# Patient Record
Sex: FEMALE | Race: BLACK OR AFRICAN AMERICAN | Employment: OTHER | URBAN - METROPOLITAN AREA
[De-identification: names, ages, dates, MRNs, and addresses within clinical notes are randomized per-mention and may not be internally consistent; named-entity substitution may affect disease eponyms.]

---

## 2018-02-05 LAB
CREATININE, EXTERNAL: 0.65
HBA1C MFR BLD HPLC: 5.7 %
LDL-C, EXTERNAL: 91

## 2018-06-14 ENCOUNTER — OFFICE VISIT (OUTPATIENT)
Dept: ENDOCRINOLOGY | Age: 68
End: 2018-06-14

## 2018-06-14 VITALS
HEIGHT: 59 IN | DIASTOLIC BLOOD PRESSURE: 74 MMHG | HEART RATE: 68 BPM | SYSTOLIC BLOOD PRESSURE: 143 MMHG | WEIGHT: 185 LBS | BODY MASS INDEX: 37.29 KG/M2

## 2018-06-14 DIAGNOSIS — E55.9 VITAMIN D DEFICIENCY: ICD-10-CM

## 2018-06-14 DIAGNOSIS — M88.9 PAGET DISEASE OF BONE: Primary | ICD-10-CM

## 2018-06-14 RX ORDER — ANASTROZOLE 1 MG/1
1 TABLET ORAL DAILY
COMMUNITY
End: 2020-01-20

## 2018-06-14 RX ORDER — CHOLECALCIFEROL TAB 125 MCG (5000 UNIT) 125 MCG
TAB ORAL DAILY
COMMUNITY
End: 2020-01-20

## 2018-06-14 RX ORDER — AMLODIPINE BESYLATE 5 MG/1
5 TABLET ORAL DAILY
COMMUNITY
End: 2020-10-22

## 2018-06-14 RX ORDER — ACETAMINOPHEN 500 MG
TABLET ORAL
COMMUNITY
End: 2020-06-23

## 2018-06-14 NOTE — PROGRESS NOTES
Chief Complaint   Patient presents with    Osteoporosis    New Patient     History of Present Illness: Angela Cavanaugh is a 75 y.o. female presents for evaluation of Paget's. She does have pain in left hip and radiation down left leg. Pain bothers her at end of the day after being active. She will take Aleve every few weeks if pain is very bothersome. Social:  Works for [employer]. Past Medical History:   Diagnosis Date    [facility]ortic [facility]     Asymmetric septal hypertrophy (HCC)     Breast cancer ([facility] [facility]     dx 7709    Diastolic heart failure ([address]     H/O partial adrenalectomy ([facility] [facility]     HTN (hypertension)     Paget disease of bone      Past Surgical History:   Procedure Laterality Date    HX ABDOMINAL LAPAROSCOPY      reports having mass removed near kidney (adrenal gland) to address HTN.  HX BREAST LUMPECTOMY      2014     Current Outpatient Prescriptions   Medication Sig    ergocalciferol, vitamin D2, (VITAMIN D2 PO) Take  by mouth.  anastrozole (ARIMIDEX) 1 mg tablet Take 1 mg by mouth daily.  amLODIPine (NORVASC) 5 mg tablet Take 5 mg by mouth daily.  acetaminophen (TYLENOL EXTRA STRENGTH) 500 mg tablet Take  by mouth every six (6) hours as needed for Pain. No current facility-administered medications for this visit. No Known Allergies  Family History   Problem Relation Age of Onset    Diabetes Mother     Lung Cancer Mother     Hypertension Mother     Lung Cancer Father      Social History     Social History    Marital status:      Spouse name: N/A    Number of children: N/A    Years of education: N/A     Occupational History    Not on file.      Social History Main Topics    Smoking status: Never Smoker    Smokeless tobacco: Never Used    Alcohol use No    Drug use: Not on file    Sexual activity: Not on file     Other Topics Concern    Not on file     Social History Narrative    No narrative on file       Review of Systems:  - Constitutional Symptoms: no fevers, chills, weight loss  - Eyes: no blurry vision or double vision  - Cardiovascular: no chest pain or palpitations  - Respiratory: no cough or shortness of breath  - Gastrointestinal: no dysphagia or abdominal pain  - Musculoskeletal: no joint pains or weakness  - Integumentary: no rashes  - Neurological: no numbness, tingling, or headaches  - Psychiatric: no depression or anxiety  - Endocrine: no heat or cold intolerance, no polyuria or polydipsia    Physical Examination:  Visit Vitals    /74    Pulse 68    Ht 4' 11\" (1.499 m)    Wt 185 lb (83.9 kg)    BMI 37.37 kg/m2   -   - General: pleasant, no distress,   - HEENT:No scleral/conjunctival injection, EOMI,  MMM  - Neck: supple, no thyromegaly, masses, lymph nodes, no supraclavicular or dorsocervical fat pads  - Cardiovascular: regular, normal rate  - Respiratory: normal effort  - Integumentary:  no edema  - Neurological: alert  - Psychiatric: normal mood and affect    Data Reviewed:   Nuclear medicine bone scan whole body- 11/25/2016  Heterogeneously increased uptake involves left iliac bone including iliac crest, superior and inferior pubic rami and left acetabulum, correlating with the heterogeneous sclerotic changes seen on multiple prior CTs glands, including earliest available CT from May 18, 2014     Labs from 2/5/2018  CBC normal  Chemistry significant for alkaline phosphatase of 213, otherwise normal  Cholesterol 27, triglycerides 92, HDL 78, LDL 91  Hemoglobin C5 0.7  20 two-point    Assessment/Plan:   1. Paget disease of bone   Region of interest was located in the left hip. This may be associated with some pain. She is planning on having her teeth extracted. Recommended she wait until after she has her teeth removed, and has healed, prior to proceeding with anti-osteoporosis medications. Reviewed with her that Reclast is typically the recommended treatment for this condition.   Treatment is often only needed once every 10 years. And symptoms may improve as well. Additionally, she will be less likely fracture after treatment. Reviewed potential side effects of Reclast as well. 2. Vitamin D deficiency   Continue supplementation-    Greater than 50% of 30 minute visit was spent counseling the patient about above. Patient Instructions   Paget's disease: Will obtain records from Dr Yoly Nation on treating you with Reclast, which is a medication used to treat osteoporosis. This is a 15 minute infusion. It works to decrease activity of osteoclasts, which are the cells in the bones that break down bones. Drink 2 full glasses of water prior to Reclast infusion and continue to drink more later in the day. If you have some muscle aches and mild fever after infusion, you can take Tylenol    Continue vitamin D - please confirm your dose      Will plan on having the Reclast done after you have healed from your teeth extractions. Follow-up Disposition:  Return in about 3 months (around 9/14/2018).     Copy sent to:

## 2018-06-14 NOTE — PATIENT INSTRUCTIONS
Paget's disease: Will obtain records from Dr Hortensia Jackson on treating you with Reclast, which is a medication used to treat osteoporosis. This is a 15 minute infusion. It works to decrease activity of osteoclasts, which are the cells in the bones that break down bones. Drink 2 full glasses of water prior to Reclast infusion and continue to drink more later in the day. If you have some muscle aches and mild fever after infusion, you can take Tylenol    Continue vitamin D - please confirm your dose      Will plan on having the Reclast done after you have healed from your teeth extractions.

## 2018-06-14 NOTE — MR AVS SNAPSHOT
[address] [facility] 
[phone] Patient: Paolo Lehman MRN: [id number] BNL:[id number] Visit Information Date & Time Provider Department Dept. Phone Encounter #  
 6/14/2018 10:50 AM Ernie Hernandez, [address] [place] [address] Diabetes and Endocrinology [phone] [id number] Follow-up Instructions Return in about 3 months (around 9/14/2018). Allergies as of 6/14/2018  Review Complete On: 6/14/2018 By: Ernie Hernandez MD  
 No Known Allergies Current Immunizations  Never Reviewed No immunizations on file. Not reviewed this visit You Were Diagnosed With   
  
 Codes Comments Paget disease of bone    -  Primary ICD-10-CM: M88.9 ICD-9-CM: 731.0 Vitamin D deficiency     ICD-10-CM: E55.9 ICD-9-CM: 268.9 Vitals BP Pulse Height(growth percentile) Weight(growth percentile) BMI Smoking Status 143/74 68 4' 11\" (1.499 m) 185 lb (83.9 kg) 37.37 kg/m2 Never Smoker Vitals History BMI and BSA Data Body Mass Index Body Surface Area  
 37.37 kg/m 2 1.87 m 2 Preferred Pharmacy Pharmacy Name Phone [id number] [phone] Your Updated Medication List  
  
   
This list is accurate as of 6/14/18 12:47 PM.  Always use your most recent med list. amLODIPine 5 mg tablet Commonly known as:  Mikal Hira Take 5 mg by mouth daily. anastrozole 1 mg tablet Commonly known as:  ARIMIDEX Take 1 mg by mouth daily. cholecalciferol (VITAMIN D3) 5,000 unit Tab tablet Commonly known as:  VITAMIN D3 Take  by mouth daily. TYLENOL EXTRA STRENGTH 500 mg tablet Generic drug:  acetaminophen Take  by mouth every six (6) hours as needed for Pain. Follow-up Instructions Return in about 3 months (around 9/14/2018). Patient Instructions Paget's disease: Will obtain records from Dr Valentin Mccarty Will plan on treating you with Reclast, which is a medication used to treat osteoporosis. This is a 15 minute infusion. It works to decrease activity of osteoclasts, which are the cells in the bones that break down bones. Drink 2 full glasses of water prior to Reclast infusion and continue to drink more later in the day. If you have some muscle aches and mild fever after infusion, you can take Tylenol Continue vitamin D - please confirm your dose Will plan on having the Reclast done after you have healed from your teeth extractions. Introducing [facility]! Magalis Corral introduces [facility] patient portal. Now you can access parts of your medical record, email your doctor's office, and request medication refills online. 1. In your internet browser, go to [email or web address] 2. Click on the First Time User? Click Here link in the Sign In box. You will see the New Member Sign Up page. 3. Enter your [facility] Access Code exactly as it appears below. You will not need to use this code after youve completed the sign-up process. If you do not sign up before the expiration date, you must request a new code. · [facility] Access Code: [email or web address] Expires: 9/12/2018 12:45 PM 
 
4. Enter the last four digits of your Social Security Number (xxxx) and Date of Birth (mm/dd/yyyy) as indicated and click Submit. You will be taken to the next sign-up page. 5. Create a [employer] ID. This will be your [facility] login ID and cannot be changed, so think of one that is secure and easy to remember. 6. Create a [facility] password. You can change your password at any time. 7. Enter your Password Reset Question and Answer. This can be used at a later time if you forget your password. 8. Enter your e-mail address. You will receive e-mail notification when new information is available in [address] 9. Click Sign Up. You can now view and download portions of your medical record. 10. Click the Download Summary menu link to download a portable copy of your medical information. If you have questions, please visit the Frequently Asked Questions section of the [employer] website. Remember, [employer] is NOT to be used for urgent needs. For medical emergencies, dial 911. Now available from your iPhone and Android! Please provide this summary of care documentation to your next provider. Your primary care clinician is listed as Ag Ordaz. If you have any questions after today's visit, please call [phone].

## 2018-07-05 ENCOUNTER — TELEPHONE (OUTPATIENT)
Dept: ENDOCRINOLOGY | Age: 68
End: 2018-07-05

## 2018-07-05 NOTE — TELEPHONE ENCOUNTER
Kelechi Ribeiro from Dr. Colin Dhaliwal) is calling for the progress notes from patient 's last appointment(which was her np on 06/14/18. Please fax it to [phone]. Yuko Perry.

## 2018-08-20 ENCOUNTER — HOSPITAL ENCOUNTER (OUTPATIENT)
Dept: LAB | Age: 68
Discharge: HOME | End: 2018-08-20

## 2018-08-20 PROCEDURE — 88305 TISSUE EXAM BY PATHOLOGIST: CPT | Performed by: PLASTIC SURGERY

## 2018-11-16 ENCOUNTER — OFFICE VISIT (OUTPATIENT)
Dept: ENDOCRINOLOGY | Age: 68
End: 2018-11-16

## 2018-11-16 VITALS
HEIGHT: 59 IN | DIASTOLIC BLOOD PRESSURE: 82 MMHG | BODY MASS INDEX: 37.5 KG/M2 | HEART RATE: 68 BPM | WEIGHT: 186 LBS | SYSTOLIC BLOOD PRESSURE: 165 MMHG

## 2018-11-16 DIAGNOSIS — E55.9 VITAMIN D DEFICIENCY: ICD-10-CM

## 2018-11-16 DIAGNOSIS — K08.9 DENTAL DISEASE: ICD-10-CM

## 2018-11-16 DIAGNOSIS — M88.88 PAGET'S DISEASE OF BONY PELVIS: Primary | ICD-10-CM

## 2018-11-16 NOTE — PATIENT INSTRUCTIONS
Paget's disease:    Have dental work done as soon as possible. I would like you to have this done before considering the Reclast treatment. Continue vitamin D    In May have labs repeated for kidney function, alkaline phophatase, and vitamin D in anticipation of having the Reclast.     In May or June, we will plan on treating you with Reclast, which is a medication used to treat osteoporosis. This is a 15 minute infusion. It works to decrease activity of osteoclasts, which are the cells in the bones that break down bones. Drink 2 full glasses of water prior to Reclast infusion and continue to drink more later in the day.     If you have some muscle aches and mild fever after infusion, you can take Tylenol

## 2018-11-16 NOTE — PROGRESS NOTES
History of Present Illness: Camron Headley is a 70 y.o. female presents for follow-up Paget's disease of bone    She was to have dental work done, prior to treatment with Reclast, but has yet to have this done, due to lack of funds. She does have pain in left hip and radiation down left leg. Pain bothers her at end of the day after being active. She will take Aleve every few weeks if pain is very bothersome. Social:  Works for [employer]. Past Medical History:   Diagnosis Date    [facility] ([facility] [facility]     Asymmetric septal hypertrophy (HCC)     Breast cancer ([facility] [address] [facility]     dx 8697    Diastolic heart failure ([facility]     H/O partial adrenalectomy (HCC)     HTN (hypertension)     Paget disease of bone      Current Outpatient Medications   Medication Sig    amLODIPine (NORVASC) 5 mg tablet Take 5 mg by mouth daily.  cholecalciferol, VITAMIN D3, (VITAMIN D3) 5,000 unit tab tablet Take  by mouth daily.  anastrozole (ARIMIDEX) 1 mg tablet Take 1 mg by mouth daily.  acetaminophen (TYLENOL EXTRA STRENGTH) 500 mg tablet Take  by mouth every six (6) hours as needed for Pain. No current facility-administered medications for this visit. No Known Allergies      Physical Examination:  Visit Vitals  /82   Pulse 68   Ht 4' 11\" (1.499 m)   Wt 186 lb (84.4 kg)   BMI 37.57 kg/m²   -   - General: pleasant, no distress, normal gait   HEENT: hearing intact, EOMI, clear sclera without icterus  - Cardiovascular: regular, normal rate   - Respiratory: normal effort  - Integumentary: no edema  - Psychiatric: normal mood and affect    Data Reviewed: At her last visit I I did not have access to the bone scan which done in 11/2016 and scan late June 2018  Scan showed Paget's lesion in left pelvis, affecting the iliac crest, as well as the acetabulum and superior and inferior pubic rami    Assessment/Plan:   1.  Paget's disease of bony pelvis   Advised her to have her teeth taken care of as soon as possible, Reclast infusion to treat the Paget's. Reviewed that I would recommend treatment for the Paget's due to the involvement of the pelvis and particularly due to the incontinence of the acetabulum, which could be associate with her symptoms and which could be associate with pain with movement of the hip. 2. Vitamin D deficiency   Continue supplementation   3. Dental disease   Is currently trying to accumulate enough money to have her dental work done. Patient Instructions   Paget's disease:    Have dental work done as soon as possible. I would like you to have this done before considering the Reclast treatment. Continue vitamin D    In May have labs repeated for kidney function, alkaline phophatase, and vitamin D in anticipation of having the Reclast.     In May or June, we will plan on treating you with Reclast, which is a medication used to treat osteoporosis. This is a 15 minute infusion. It works to decrease activity of osteoclasts, which are the cells in the bones that break down bones. Drink 2 full glasses of water prior to Reclast infusion and continue to drink more later in the day. If you have some muscle aches and mild fever after infusion, you can take Tylenol        Follow-up Disposition:  Return in about 6 months (around 5/16/2019).     Copy sent to:

## 2019-03-29 ENCOUNTER — HOSPITAL ENCOUNTER (OUTPATIENT)
Dept: MRI IMAGING | Age: 69
Discharge: HOME | End: 2019-03-29
Attending: FAMILY MEDICINE
Payer: MEDICARE

## 2019-03-29 DIAGNOSIS — M75.122 COMPLETE ROTATOR CUFF TEAR OF LEFT SHOULDER: ICD-10-CM

## 2019-03-29 PROCEDURE — 73221 MRI JOINT UPR EXTREM W/O DYE: CPT

## 2020-01-20 ENCOUNTER — HOSPITAL ENCOUNTER (OUTPATIENT)
Dept: PREADMISSION TESTING | Age: 70
Discharge: HOME | End: 2020-01-20
Payer: MEDICARE

## 2020-01-20 VITALS
HEIGHT: 62 IN | BODY MASS INDEX: 32.27 KG/M2 | TEMPERATURE: 98.4 F | RESPIRATION RATE: 16 BRPM | WEIGHT: 175.38 LBS | OXYGEN SATURATION: 100 % | SYSTOLIC BLOOD PRESSURE: 184 MMHG | DIASTOLIC BLOOD PRESSURE: 85 MMHG | HEART RATE: 76 BPM

## 2020-01-20 LAB
ABO + RH BLD: NORMAL
ALBUMIN SERPL-MCNC: 3.8 G/DL (ref 3.5–5)
ALBUMIN/GLOB SERPL: 1 {RATIO} (ref 1.1–2.2)
ALP SERPL-CCNC: 203 U/L (ref 45–117)
ALT SERPL-CCNC: 30 U/L (ref 12–78)
ANION GAP SERPL CALC-SCNC: 6 MMOL/L (ref 5–15)
APPEARANCE UR: CLEAR
AST SERPL-CCNC: 27 U/L (ref 15–37)
ATRIAL RATE: 71 BPM
BACTERIA URNS QL MICRO: ABNORMAL /HPF
BASOPHILS # BLD: 0.1 K/UL (ref 0–0.1)
BASOPHILS NFR BLD: 1 % (ref 0–1)
BILIRUB SERPL-MCNC: 0.9 MG/DL (ref 0.2–1)
BILIRUB UR QL: NEGATIVE
BLOOD GROUP ANTIBODIES SERPL: NORMAL
BUN SERPL-MCNC: 22 MG/DL (ref 6–20)
BUN/CREAT SERPL: 31 (ref 12–20)
CALCIUM SERPL-MCNC: 9.6 MG/DL (ref 8.5–10.1)
CALCULATED P AXIS, ECG09: 58 DEGREES
CALCULATED R AXIS, ECG10: 35 DEGREES
CALCULATED T AXIS, ECG11: 55 DEGREES
CHLORIDE SERPL-SCNC: 109 MMOL/L (ref 97–108)
CO2 SERPL-SCNC: 25 MMOL/L (ref 21–32)
COLOR UR: ABNORMAL
CREAT SERPL-MCNC: 0.72 MG/DL (ref 0.55–1.02)
DIAGNOSIS, 93000: NORMAL
DIFFERENTIAL METHOD BLD: ABNORMAL
EOSINOPHIL # BLD: 0.2 K/UL (ref 0–0.4)
EOSINOPHIL NFR BLD: 3 % (ref 0–7)
EPITH CASTS URNS QL MICRO: ABNORMAL /LPF
ERYTHROCYTE [DISTWIDTH] IN BLOOD BY AUTOMATED COUNT: 14.4 % (ref 11.5–14.5)
EST. AVERAGE GLUCOSE BLD GHB EST-MCNC: 123 MG/DL
GLOBULIN SER CALC-MCNC: 4 G/DL (ref 2–4)
GLUCOSE SERPL-MCNC: 89 MG/DL (ref 65–100)
GLUCOSE UR STRIP.AUTO-MCNC: NEGATIVE MG/DL
HBA1C MFR BLD: 5.9 % (ref 4–5.6)
HCT VFR BLD AUTO: 42.5 % (ref 35–47)
HGB BLD-MCNC: 13.4 G/DL (ref 11.5–16)
HGB UR QL STRIP: NEGATIVE
HYALINE CASTS URNS QL MICRO: ABNORMAL /LPF (ref 0–5)
IMM GRANULOCYTES # BLD AUTO: 0 K/UL (ref 0–0.04)
IMM GRANULOCYTES NFR BLD AUTO: 0 % (ref 0–0.5)
KETONES UR QL STRIP.AUTO: NEGATIVE MG/DL
LEUKOCYTE ESTERASE UR QL STRIP.AUTO: NEGATIVE
LYMPHOCYTES # BLD: 1.8 K/UL (ref 0.8–3.5)
LYMPHOCYTES NFR BLD: 33 % (ref 12–49)
MCH RBC QN AUTO: 25.9 PG (ref 26–34)
MCHC RBC AUTO-ENTMCNC: 31.5 G/DL (ref 30–36.5)
MCV RBC AUTO: 82 FL (ref 80–99)
MONOCYTES # BLD: 0.3 K/UL (ref 0–1)
MONOCYTES NFR BLD: 6 % (ref 5–13)
NEUTS SEG # BLD: 2.9 K/UL (ref 1.8–8)
NEUTS SEG NFR BLD: 57 % (ref 32–75)
NITRITE UR QL STRIP.AUTO: POSITIVE
NRBC # BLD: 0 K/UL (ref 0–0.01)
NRBC BLD-RTO: 0 PER 100 WBC
P-R INTERVAL, ECG05: 154 MS
PH UR STRIP: 5.5 [PH] (ref 5–8)
PLATELET # BLD AUTO: 325 K/UL (ref 150–400)
POTASSIUM SERPL-SCNC: 4.8 MMOL/L (ref 3.5–5.1)
PROT SERPL-MCNC: 7.8 G/DL (ref 6.4–8.2)
PROT UR STRIP-MCNC: NEGATIVE MG/DL
Q-T INTERVAL, ECG07: 392 MS
QRS DURATION, ECG06: 78 MS
QTC CALCULATION (BEZET), ECG08: 425 MS
RBC # BLD AUTO: 5.18 M/UL (ref 3.8–5.2)
RBC #/AREA URNS HPF: ABNORMAL /HPF (ref 0–5)
RBC MORPH BLD: ABNORMAL
SODIUM SERPL-SCNC: 140 MMOL/L (ref 136–145)
SP GR UR REFRACTOMETRY: 1.02 (ref 1–1.03)
SPECIMEN EXP DATE BLD: NORMAL
UA: UC IF INDICATED,UAUC: ABNORMAL
UROBILINOGEN UR QL STRIP.AUTO: 0.2 EU/DL (ref 0.2–1)
VENTRICULAR RATE, ECG03: 71 BPM
WBC # BLD AUTO: 5.3 K/UL (ref 3.6–11)
WBC URNS QL MICRO: ABNORMAL /HPF (ref 0–4)

## 2020-01-20 PROCEDURE — 85025 COMPLETE CBC W/AUTO DIFF WBC: CPT

## 2020-01-20 PROCEDURE — 87077 CULTURE AEROBIC IDENTIFY: CPT

## 2020-01-20 PROCEDURE — 86900 BLOOD TYPING SEROLOGIC ABO: CPT

## 2020-01-20 PROCEDURE — 87186 SC STD MICRODIL/AGAR DIL: CPT

## 2020-01-20 PROCEDURE — 81001 URINALYSIS AUTO W/SCOPE: CPT

## 2020-01-20 PROCEDURE — 80053 COMPREHEN METABOLIC PANEL: CPT

## 2020-01-20 PROCEDURE — 83036 HEMOGLOBIN GLYCOSYLATED A1C: CPT

## 2020-01-20 PROCEDURE — 36415 COLL VENOUS BLD VENIPUNCTURE: CPT

## 2020-01-20 PROCEDURE — 87086 URINE CULTURE/COLONY COUNT: CPT

## 2020-01-20 PROCEDURE — 93005 ELECTROCARDIOGRAM TRACING: CPT

## 2020-01-20 RX ORDER — ERGOCALCIFEROL 1.25 MG/1
50000 CAPSULE ORAL
COMMUNITY
End: 2020-10-22

## 2020-01-20 NOTE — PERIOP NOTES
Patient complaining of tooth pain x several days. Advised to contact dentist today and to call PAT if any medication is prescribed.

## 2020-01-20 NOTE — H&P
Preoperative Evaluation                     History and Physical with Surgical Risk Stratification     1/20/2020    CC: Left shoulder pain  Surgery: LRTS     HPI:   Jayde Ken is a 78 y.o. female referred for pre-operative evaluation by Dr. Shweta Strange for surgery on 1/31/20. Ms. Tauna Snellen comes today in [facility] for her upcoming surgery. She struggles a bit with past medical history and states \"I don't remember\". There was several things under medical history that she declined were hers. She notes her shoulder has been hurting for about a year and the pain is severe. She had a fall a year ago which started the pain. She has failed all conservative measures. She is LDH and has limited ROM. She feels the pain and immobility is impacting her life. She notes if she holds a cup of coffee with her left hand she will drop it. Denies numbness and tingling. Ms. Tauna Snellen states she has an infected tooth on the upper left side that started to hurt Friday. She has a history of this and she states last time she called and they put her on ABX. She will be calling after she leaves here. The patient was evaluated in the surgeon's office and it was determined that the most appropriate plan of care is to proceed with surgical intervention. Patient's PCP Sarahi Blas DO    Review of Systems     Constitutional: Negative for chills and fever  HENT: Negative for congestion and sore throat  Eyes: negative for blurred vision and double vision  Respiratory: Negative for cough, shortness of breath and wheezing  Mouth: Positive for left upper painful tooth  Cardiovascular: Negative for chest pain and palpitations  Gastrointestinal: Negative for abdominal pain, constipation, diarrhea and nausea  Genitourinary: Negative for dysuria and hematuria  Musculoskeletal: Positive for left shoulder pain  Skin: Negative for rash, open wounds.   Negative for bruises easily  Neurological: Negative for dizziness, tremors and headaches  Psychiatric: Negative for depression. The patient is not nervous/anxious. Inherent Risk of Surgery     Surgical risk:  Intermediate  Low:  EIntermediate:  Joint Replacement, High:    Patient Cardiac Risk Assessment     Revised Cardiac Risk Index (RCRI)    Rate if cardiac death, nonfatal MI, nonfatal cardiac arrest by number of risk factor- 0.4%    CHRISTAL/AHA 2007 Guidelines:   1) Surgery Emergency, Non-cardiac -> to surgery  2) If not, look at clinical predictors    Major Intermediate Minor       Blood Thinner: None    METS      EQUAL TO 4 Care for self Walk indoors around house Walk 2-3 blocks on level ground (2-3 mph) Light work around house (dust, dishes)     Other Risk Factors:   Screening for ETOH use:  Done and low risk  Smoking status:   None    Personal or FH of bleeding problems:  No  Personal or FH of blood clots:  No  Personal or FH of anesthesia problems:   No    Pulmonary Risk:  Asthma or COPD:  No  Body mass index is 32.08 kg/m². Known ERICH:  No  Albumin normal, BUN normal    Past Medical, Surgical, Social History     Allergies: No Known Allergies    Patient did bring in medication list/bottles to review. Medication Documentation Review Audit     Reviewed by Ty Silva RN (Registered Nurse) on 01/20/20 at 8517    Medication Sig Documenting Provider Last Dose Status Taking?   acetaminophen (TYLENOL EXTRA STRENGTH) 500 mg tablet Take  by mouth every six (6) hours as needed for Pain. Provider, Historical  Active Yes   amLODIPine (NORVASC) 5 mg tablet Take 5 mg by mouth daily. Provider, Historical  Active Yes   ergocalciferol (ERGOCALCIFEROL) 1,250 mcg (50,000 unit) capsule Take 50,000 Units by mouth every seven (7) days.  Provider, Historical  Active Yes              Past Medical History:   Diagnosis Date    Breast cancer ([facility] [address] 2014    H/O partial adrenalectomy ([facility] [address]     HTN (hypertension)     Low vitamin D level     Obesity, Class I, BMI 30-34.9     Paget disease of bone     Prediabetes 01/20/2020    5.9    SBO (small bowel obstruction) ([facility] [address] 1995     Past Surgical History:   Procedure Laterality Date    HX ABDOMINAL LAPAROSCOPY      reports having mass removed near kidney (adrenal gland) to address HTN.  HX BLADDER SUSPENSION  2019    HX BREAST LUMPECTOMY Left 2014    HX CATARACT REMOVAL Bilateral     HX COLECTOMY  1995    HX CYST REMOVAL      Eyelid    HX KNEE REPLACEMENT Left 1998     Social History     Tobacco Use    Smoking status: Never Smoker    Smokeless tobacco: Never Used   Substance Use Topics    Alcohol use: No    Drug use: Never     Family History   Problem Relation Age of Onset    Diabetes Mother     Lung Cancer Mother     Hypertension Mother     Lung Cancer Father     Anesth Problems Neg Hx     Deep Vein Thrombosis Neg Hx        Objective     Vitals:    01/20/20 0800 01/20/20 0842   BP: (!) 207/98 184/85   Pulse: 76    Resp: 16    Temp: 98.4 °F (36.9 °C)    SpO2: 100%    Weight: 79.5 kg (175 lb 6 oz)    Height: 5' 2\" (1.575 m)      She states she had not taken her BP medications before coming today. I gave her some water and she took medication during appointment. Constitutional:  Appears well,  No Acute Distress, Vitals noted  Psychiatric:   Affect normal, Alert and Oriented to person/place/time    Eyes:   Pupils equally round and reactive, EOMI, conjunctiva clear, eyelids normal  ENT:   External ears and nose normal/lips, teeth normal, gums normal, TMs and Orophyarynx normal  Neck:   General inspection and Thyroid normal.  No abnormal cervical or supraclavicular nodes    Lungs:   Clear to auscultation, good respiratory effort  Heart: Ausculation normal.  Regular rhythm. No cardiac murmurs.   No carotid bruits or palpable thrills  Chest wall normal  Musculoskeletal: Limited ROM to left shoulder  Extremities:   Without edema, good peripheral pulses  Skin:   Warm to palpation, without rashes, bruising, or suspicious lesions     Recent Results (from the past 72 hour(s))   CBC WITH AUTOMATED DIFF    Collection Time: 01/20/20  8:58 AM   Result Value Ref Range    WBC 5.3 3.6 - 11.0 K/uL    RBC 5.18 3.80 - 5.20 M/uL    HGB 13.4 11.5 - 16.0 g/dL    HCT 42.5 35.0 - 47.0 %    MCV 82.0 80.0 - 99.0 FL    MCH 25.9 (L) 26.0 - 34.0 PG    MCHC 31.5 30.0 - 36.5 g/dL    RDW 14.4 11.5 - 14.5 %    PLATELET 616 171 - 235 K/uL    NRBC 0.0 0  WBC    ABSOLUTE NRBC 0.00 0.00 - 0.01 K/uL    NEUTROPHILS 57 32 - 75 %    LYMPHOCYTES 33 12 - 49 %    MONOCYTES 6 5 - 13 %    EOSINOPHILS 3 0 - 7 %    BASOPHILS 1 0 - 1 %    IMMATURE GRANULOCYTES 0 0.0 - 0.5 %    ABS. NEUTROPHILS 2.9 1.8 - 8.0 K/UL    ABS. LYMPHOCYTES 1.8 0.8 - 3.5 K/UL    ABS. MONOCYTES 0.3 0.0 - 1.0 K/UL    ABS. EOSINOPHILS 0.2 0.0 - 0.4 K/UL    ABS. BASOPHILS 0.1 0.0 - 0.1 K/UL    ABS. IMM. GRANS. 0.0 0.00 - 0.04 K/UL    DF SMEAR SCANNED      RBC COMMENTS NORMOCYTIC, NORMOCHROMIC     METABOLIC PANEL, COMPREHENSIVE    Collection Time: 01/20/20  8:58 AM   Result Value Ref Range    Sodium 140 136 - 145 mmol/L    Potassium 4.8 3.5 - 5.1 mmol/L    Chloride 109 (H) 97 - 108 mmol/L    CO2 25 21 - 32 mmol/L    Anion gap 6 5 - 15 mmol/L    Glucose 89 65 - 100 mg/dL    BUN 22 (H) 6 - 20 MG/DL    Creatinine 0.72 0.55 - 1.02 MG/DL    BUN/Creatinine ratio 31 (H) 12 - 20      GFR est AA >60 >60 ml/min/1.73m2    GFR est non-AA >60 >60 ml/min/1.73m2    Calcium 9.6 8.5 - 10.1 MG/DL    Bilirubin, total 0.9 0.2 - 1.0 MG/DL    ALT (SGPT) 30 12 - 78 U/L    AST (SGOT) 27 15 - 37 U/L    Alk.  phosphatase 203 (H) 45 - 117 U/L    Protein, total 7.8 6.4 - 8.2 g/dL    Albumin 3.8 3.5 - 5.0 g/dL    Globulin 4.0 2.0 - 4.0 g/dL    A-G Ratio 1.0 (L) 1.1 - 2.2     HEMOGLOBIN A1C WITH EAG    Collection Time: 01/20/20  8:58 AM   Result Value Ref Range    Hemoglobin A1c 5.9 (H) 4.0 - 5.6 %    Est. average glucose 123 mg/dL   URINALYSIS W/ REFLEX CULTURE    Collection Time: 01/20/20  8:58 AM   Result Value Ref Range    Color YELLOW/STRAW Appearance CLEAR CLEAR      Specific gravity 1.018 1.003 - 1.030      pH (UA) 5.5 5.0 - 8.0      Protein NEGATIVE  NEG mg/dL    Glucose NEGATIVE  NEG mg/dL    Ketone NEGATIVE  NEG mg/dL    Bilirubin NEGATIVE  NEG      Blood NEGATIVE  NEG      Urobilinogen 0.2 0.2 - 1.0 EU/dL    Nitrites POSITIVE (A) NEG      Leukocyte Esterase NEGATIVE  NEG      WBC 0-4 0 - 4 /hpf    RBC 0-5 0 - 5 /hpf    Epithelial cells FEW FEW /lpf    Bacteria 4+ (A) NEG /hpf    UA:UC IF INDICATED URINE CULTURE ORDERED (A) CNI      Hyaline cast 0-2 0 - 5 /lpf   TYPE & SCREEN    Collection Time: 01/20/20  8:58 AM   Result Value Ref Range    Crossmatch Expiration 02/03/2020     ABO/Rh(D) O POSITIVE     Antibody screen NEG    EKG, 12 LEAD, INITIAL    Collection Time: 01/20/20  9:24 AM   Result Value Ref Range    Ventricular Rate 71 BPM    Atrial Rate 71 BPM    P-R Interval 154 ms    QRS Duration 78 ms    Q-T Interval 392 ms    QTC Calculation (Bezet) 425 ms    Calculated P Axis 58 degrees    Calculated R Axis 35 degrees    Calculated T Axis 55 degrees    Diagnosis       Normal sinus rhythm  Normal ECG  No previous ECGs available  Confirmed by Wendi Walters MD., Maribel Hernandes ([id number]) on 1/20/2020 12:11:49 PM         Assessment and Plan     Assessment/Plan:   1) OA Left Shoulder  2) Pre-Operative Evaluation    Labs and EKG reviewed. MRSA & Urine culture pending    I have instructed her to call us after speaking to the dentist. It is important that she clear up any infection in her mouth before having her joint replaced. Sent message to PA also. PT/PTT clotted in lab. Draw DOS. Preoperative Clearance  Per RCRI, the patient has a 0.4% risk of cardiac death, nonfatal MI, nonfatal cardiac arrest based on no risk factors. Per ACC/AHA guidelines, patient is low risk for a(n) intermediate risk surgery and may proceed to planned surgery with the above noted risk.     Kacey Dunham NP

## 2020-01-20 NOTE — PERIOP NOTES
[address] [address] [facility]   MAIN OR                                  [phone]   MAIN PRE OP                          [phone]                                                                                AMBULATORY PRE OP          [phone]  PRE-ADMISSION TESTING    [phone]     Surgery Date:   Friday January 31, 2020        Is surgery arrival time given by surgeon? NO  If NO, [facility] staff will call you between 3 and 7pm the day before your surgery with your arrival time. (If your surgery is on a Monday, we will call you the Friday before.)    Call [phone] after 7pm Monday-Friday if you did not receive this call. INSTRUCTIONS BEFORE YOUR SURGERY   When You  Arrive Arrive at the 2nd [address] [facility] on the day of your surgery  Have your insurance card, photo ID, and any copayment (if needed)   Food   and   Drink NO food or drink after midnight the night before surgery    This means NO water, gum, mints, coffee, juice, etc.  No alcohol (beer, wine, liquor) 24 hours before and after surgery   Medications to   TAKE   Morning of Surgery MEDICATIONS TO TAKE THE MORNING OF SURGERY WITH A SIP OF WATER:    Amolodipine   Medications  To  STOP      7 days before surgery  Non-Steroidal anti-inflammatory Drugs (NSAID's): for example, Ibuprofen (Advil, Motrin), Naproxen (Aleve)   Aspirin, if taking for pain    Herbal supplements, vitamins, and fish oil     (Pain medications not listed above, including Tylenol may be taken)   Blood  Thinners  Not applicable   Bathing Clothing  Jewelry  Valuables      If you shower the morning of surgery, please do not apply anything to your skin (lotions, powders, deodorant, or makeup, especially mascara)   Follow Chlorhexidine Care Fusion body wash instructions provided to you during PAT appointment. Begin 3 days prior to surgery.    Do not shave or trim anywhere 24 hours before surgery   Wear your hair loose or down; no pony-tails, buns, or metal hair clips   Wear loose, comfortable, clean clothes   Leave money, valuables, and jewelry, including body piercings, at home   Going Home - or Spending the Night  SAME-DAY SURGERY: You must have a responsible adult drive you home and stay with you 24 hours after surgery   ADMITS: If your doctor is keeping you in the hospital after surgery, leave personal belongings/luggage in your car until you have a hospital room number. Hospital discharge time is 12 noon  Drivers must be here before 12 noon unless you are told differently   Special Instructions   Special Instructions:  · Use Chlorhexidine Care Fusion wash and sponges 3 days prior to surgery as instructed. · Incentive spirometer given with instructions to practice at home and bring back to the hospital on the day of surgery. · [facility] will contact you if your Hemoglobin A1C is greater than 7.5. · Ensure/Glucerna  sample, nutritional information, and Ensure/Glucerna coupon given. · Pain pamphlet and Call Don't Fall reminder reviewed with patient. ·  parking is complimentary Monday - Friday 7 am - 5 pm  · Bring PTA Medication list day of surgery with the last doses taken documented     Follow all instructions so your surgery wont be cancelled. Please, be on time. If a situation occurs and you are delayed the day of surgery, call [phone]. If your physical condition changes (like a fever, cold, flu, etc.) call your surgeon. Home medication(s) reviewed and verified verbally with patient and by bottles during PAT appointment. The patient was contacted  in person. The patient verbalizes understanding of all instructions and does not  need reinforcement.

## 2020-01-21 LAB
BACTERIA SPEC CULT: NORMAL
BACTERIA SPEC CULT: NORMAL
SERVICE CMNT-IMP: NORMAL

## 2020-01-21 RX ORDER — AMOXICILLIN 500 MG/1
500 CAPSULE ORAL 3 TIMES DAILY
COMMUNITY
End: 2020-06-09

## 2020-01-21 NOTE — PERIOP NOTES
Pt called to report antibiotic therapy initiated. Instructed her to take the entire prescription, even if the tooth felt better. Advised that it may cause nausea, so if she had not completed regime by DOS, she could hold. She verbalized understanding.

## 2020-01-22 LAB
BACTERIA SPEC CULT: ABNORMAL
CC UR VC: ABNORMAL
SERVICE CMNT-IMP: ABNORMAL

## 2020-01-23 NOTE — PROGRESS NOTES
Notification of Positive Urine Culture     Patient Name:  Ray Wilson  MRN: [id number]  : 1950    Surgeon: Jorge Banks  Date of surgery: 20  Procedure: Manjit Huertas    Allergies: No Known Allergies    Urine culture result:     Culture result:   Date Value Ref Range Status   2020 MRSA NOT PRESENT   Final   2020    Final        Screening of patient nares for MRSA is for surveillance purposes and, if positive, to facilitate isolation considerations in high risk settings. It is not intended for automatic decolonization interventions per se as regimens are not sufficiently effective to warrant routine use. 2020 ESCHERICHIA COLI (A)   Final       Treatment ordered: Patient is already on Amoxicillin that she started on Tuesday, 20 for a tooth ache. I have spoken to her that this would cover her UTI also. She is taking it 3x a day. She states her tooth feels better and the swelling has gone down. Spoke to her via phone on 20.     The patient was instructed to add medication and date they began treatment to their \"Prior to Admission Medication\" list given to them in [address], NP

## 2020-06-09 ENCOUNTER — HOSPITAL ENCOUNTER (OUTPATIENT)
Dept: PREADMISSION TESTING | Age: 70
Discharge: HOME | End: 2020-06-09
Payer: MEDICARE

## 2020-06-09 VITALS
BODY MASS INDEX: 31.01 KG/M2 | TEMPERATURE: 97.9 F | HEIGHT: 63 IN | HEART RATE: 68 BPM | OXYGEN SATURATION: 98 % | WEIGHT: 175 LBS | SYSTOLIC BLOOD PRESSURE: 150 MMHG | RESPIRATION RATE: 16 BRPM | DIASTOLIC BLOOD PRESSURE: 76 MMHG

## 2020-06-09 LAB
ABO + RH BLD: NORMAL
ALBUMIN SERPL-MCNC: 3.8 G/DL (ref 3.5–5)
ALBUMIN/GLOB SERPL: 1.1 {RATIO} (ref 1.1–2.2)
ALP SERPL-CCNC: 224 U/L (ref 45–117)
ALT SERPL-CCNC: 24 U/L (ref 12–78)
ANION GAP SERPL CALC-SCNC: 5 MMOL/L (ref 5–15)
APPEARANCE UR: CLEAR
APTT PPP: 26.9 SEC (ref 22.1–32)
AST SERPL-CCNC: 19 U/L (ref 15–37)
BACTERIA URNS QL MICRO: ABNORMAL /HPF
BASOPHILS # BLD: 0 K/UL (ref 0–0.1)
BASOPHILS NFR BLD: 1 % (ref 0–1)
BILIRUB SERPL-MCNC: 0.7 MG/DL (ref 0.2–1)
BILIRUB UR QL: NEGATIVE
BLOOD GROUP ANTIBODIES SERPL: NORMAL
BUN SERPL-MCNC: 13 MG/DL (ref 6–20)
BUN/CREAT SERPL: 17 (ref 12–20)
CALCIUM SERPL-MCNC: 9.4 MG/DL (ref 8.5–10.1)
CHLORIDE SERPL-SCNC: 109 MMOL/L (ref 97–108)
CO2 SERPL-SCNC: 27 MMOL/L (ref 21–32)
COLOR UR: ABNORMAL
CREAT SERPL-MCNC: 0.76 MG/DL (ref 0.55–1.02)
DIFFERENTIAL METHOD BLD: NORMAL
EOSINOPHIL # BLD: 0.3 K/UL (ref 0–0.4)
EOSINOPHIL NFR BLD: 7 % (ref 0–7)
EPITH CASTS URNS QL MICRO: ABNORMAL /LPF
ERYTHROCYTE [DISTWIDTH] IN BLOOD BY AUTOMATED COUNT: 14.4 % (ref 11.5–14.5)
EST. AVERAGE GLUCOSE BLD GHB EST-MCNC: 114 MG/DL
GLOBULIN SER CALC-MCNC: 3.5 G/DL (ref 2–4)
GLUCOSE SERPL-MCNC: 87 MG/DL (ref 65–100)
GLUCOSE UR STRIP.AUTO-MCNC: NEGATIVE MG/DL
HBA1C MFR BLD: 5.6 % (ref 4–5.6)
HCT VFR BLD AUTO: 40.3 % (ref 35–47)
HGB BLD-MCNC: 12.7 G/DL (ref 11.5–16)
HGB UR QL STRIP: NEGATIVE
HYALINE CASTS URNS QL MICRO: ABNORMAL /LPF (ref 0–5)
IMM GRANULOCYTES # BLD AUTO: 0 K/UL (ref 0–0.04)
IMM GRANULOCYTES NFR BLD AUTO: 0 % (ref 0–0.5)
INR PPP: 0.9 (ref 0.9–1.1)
KETONES UR QL STRIP.AUTO: NEGATIVE MG/DL
LEUKOCYTE ESTERASE UR QL STRIP.AUTO: ABNORMAL
LYMPHOCYTES # BLD: 1.9 K/UL (ref 0.8–3.5)
LYMPHOCYTES NFR BLD: 39 % (ref 12–49)
MCH RBC QN AUTO: 26.1 PG (ref 26–34)
MCHC RBC AUTO-ENTMCNC: 31.5 G/DL (ref 30–36.5)
MCV RBC AUTO: 82.9 FL (ref 80–99)
MONOCYTES # BLD: 0.4 K/UL (ref 0–1)
MONOCYTES NFR BLD: 9 % (ref 5–13)
NEUTS SEG # BLD: 2.2 K/UL (ref 1.8–8)
NEUTS SEG NFR BLD: 44 % (ref 32–75)
NITRITE UR QL STRIP.AUTO: POSITIVE
NRBC # BLD: 0 K/UL (ref 0–0.01)
NRBC BLD-RTO: 0 PER 100 WBC
PH UR STRIP: 5 [PH] (ref 5–8)
PLATELET # BLD AUTO: 311 K/UL (ref 150–400)
PMV BLD AUTO: 10.2 FL (ref 8.9–12.9)
POTASSIUM SERPL-SCNC: 4.5 MMOL/L (ref 3.5–5.1)
PROT SERPL-MCNC: 7.3 G/DL (ref 6.4–8.2)
PROT UR STRIP-MCNC: NEGATIVE MG/DL
PROTHROMBIN TIME: 9.8 SEC (ref 9–11.1)
RBC # BLD AUTO: 4.86 M/UL (ref 3.8–5.2)
RBC #/AREA URNS HPF: ABNORMAL /HPF (ref 0–5)
SODIUM SERPL-SCNC: 141 MMOL/L (ref 136–145)
SP GR UR REFRACTOMETRY: 1.02 (ref 1–1.03)
SPECIMEN EXP DATE BLD: NORMAL
THERAPEUTIC RANGE,PTTT: NORMAL SECS (ref 58–77)
UA: UC IF INDICATED,UAUC: ABNORMAL
UROBILINOGEN UR QL STRIP.AUTO: 0.2 EU/DL (ref 0.2–1)
WBC # BLD AUTO: 4.9 K/UL (ref 3.6–11)
WBC URNS QL MICRO: ABNORMAL /HPF (ref 0–4)

## 2020-06-09 PROCEDURE — 36415 COLL VENOUS BLD VENIPUNCTURE: CPT

## 2020-06-09 PROCEDURE — 85730 THROMBOPLASTIN TIME PARTIAL: CPT

## 2020-06-09 PROCEDURE — 80053 COMPREHEN METABOLIC PANEL: CPT

## 2020-06-09 PROCEDURE — 85025 COMPLETE CBC W/AUTO DIFF WBC: CPT

## 2020-06-09 PROCEDURE — 86900 BLOOD TYPING SEROLOGIC ABO: CPT

## 2020-06-09 PROCEDURE — 85610 PROTHROMBIN TIME: CPT

## 2020-06-09 PROCEDURE — 81001 URINALYSIS AUTO W/SCOPE: CPT

## 2020-06-09 PROCEDURE — 83036 HEMOGLOBIN GLYCOSYLATED A1C: CPT

## 2020-06-09 NOTE — PERIOP NOTES
It is now mandated that all surgical patients be tested for COVID-19 prior to surgery. Testing has to be exactly 4 days prior to surgery. Your COVID test date is Thursday, 6/18/20, between 8:00 am and 11:00 am.       COVID testing will be performed curbside at the [facility]. There will be signs leading you to the testing site. You will need to bring a photo ID with you to be swabbed. Patients are advised to self-quarantine at home after testing and prior to your surgery date. You will be notified if your results are positive.     What to watch for:   Coronavirus (COVID-19) affects different people in different ways   It also appears with a wide range of symptoms from mild to severe   Signs usually appear 2-14 days after exposure     If you develop any of the following, notify your doctor immediately:  o Fever  o Chills, with or without a shiver  o Muscle pain  o Headache  o Sore throat  o Dry cough  o New loss of taste or smell  o Tiredness      If you develop any of the following, call 911:  o Shortness of breath  o Difficulty breathing  o Chest pain  o New confusion  o Blueness of fingers and/or lips

## 2020-06-09 NOTE — PERIOP NOTES
[address], [address] [facility]  PRE-ADMISSION TESTING    [phone]     Surgery Date:   Monday, 6/22/20      [facility] staff will call you between 3 and 7pm the day before your surgery with your arrival time. Call [phone] after 7pm Friday if you did not receive this call. INSTRUCTIONS BEFORE YOUR SURGERY   When You  Arrive Arrive at the 2[address] [facility] on the day of your surgery  Have your insurance card, photo ID, and any copayment (if needed)   Food   and   Drink NO food or drink after midnight the night before surgery    This means NO water, gum, mints, coffee, juice, etc.  No alcohol (beer, wine, liquor) 24 hours before and after surgery   Medications to   TAKE   Morning of Surgery MEDICATIONS TO TAKE THE MORNING OF SURGERY WITH A SIP OF WATER:    amlodipine   Tylenol/acetaminophen products may be taken for pain, if needed   Please take vitamin d on Sunday, 6/21/20   Medications  To  STOP      7 days before surgery  Non-Steroidal anti-inflammatory Drugs (NSAID's): for example, Ibuprofen (Advil, Motrin), Naproxen (Aleve)   Aspirin, if taking for pain    Herbal supplements, vitamins, and fish oil   Financial Assistance  [phone]   Bathing Clothing  Jewelry  Valuables      If you shower the morning of surgery, please do not apply anything to your skin (lotions, powders, deodorant, or makeup, especially mascara)   Follow Chlorhexidine Care Fusion body wash instructions provided to you during PAT appointment. Begin 3 days prior to surgery.  Do not shave or trim anywhere 24 hours before surgery   Wear your hair loose or down; no pony-tails, buns, or metal hair clips   Wear loose, comfortable clothes   Leave money, valuables, and jewelry, including body piercings, at home  Incentive spirometer given with instructions to practice at home and bring back to the hospital on the day of surgery.   [facility] will [facility] you if your Hemoglobin A1C is greater than 7.5. Call Don't Fall reminder given. Bring Medication list (with last doses) on the day of surgery. Spending the Night Your doctor is keeping you in the hospital after surgery, bring personal belongings/luggage in with you the morning of surgery. Hospital discharge time is 12 noon  Drivers must be here before 12 noon unless you are told differently   Special Instructions If a situation occurs and you are delayed the day of surgery, call [phone]. If your physical condition changes (like a fever, cold, flu, etc.) call your surgeon. Home medication(s) reviewed and verified verbally during PAT appointment. The patient was contacted  in person. The patient verbalizes understanding of all instructions and does not  need reinforcement.

## 2020-06-10 LAB
BACTERIA SPEC CULT: NORMAL
BACTERIA SPEC CULT: NORMAL
SERVICE CMNT-IMP: NORMAL

## 2020-06-11 NOTE — H&P
Preoperative Evaluation                     History and Physical with Surgical Risk Stratification       6/9/2020    CC: Left shoulder pain  Surgery: LRTS     HPI:   Kareem Ocampo is a 78 y.o. female referred for pre-operative evaluation by Dr. Yanet De Leon for surgery on 6/22/20. Ms. Thania Jeong is here today after her surgery was cancelled in January r/t dental issues. She has since had all her teeth pulled and is awaiting dentures. She notes no new health concerns. Denies CP or SOB. For full pain history see prior H/P. The patient was evaluated in the surgeon's office and it was determined that the most appropriate plan of care is to proceed with surgical intervention. Patient's PCP Kiana Ayon, DO    Review of Systems     Constitutional: Negative for chills and fever  HENT: Negative for congestion and sore throat  Eyes: negative for blurred vision and double vision  Respiratory: Negative for cough, shortness of breath and wheezing  Mouth: No teeth  Cardiovascular: Negative for chest pain and palpitations  Gastrointestinal: Negative for abdominal pain, constipation, diarrhea and nausea  Genitourinary: Negative for dysuria and hematuria  Musculoskeletal: Shoulder pain  Skin: Negative for rash, open wounds. Negative for bruises easily  Neurological: Negative for dizziness, tremors and headaches  Psychiatric: Negative for depression. The patient is not nervous/anxious.     Inherent Risk of Surgery     Surgical risk:  Intermediate  Low:  EIntermediate:  Joint Replacement High:    Patient Cardiac Risk Assessment     Revised Cardiac Risk Index (RCRI)    Rate if cardiac death, nonfatal MI, nonfatal cardiac arrest by number of risk factor- 0.4%    CHRISTAL/AHA 2007 Guidelines:   1) Surgery Emergency, Non-cardiac -> to surgery  2) If not, look at clinical predictors    Major Intermediate Minor       Blood Thinner: NA    METS      EQUAL TO 4 Care for self Walk indoors around house Walk 2-3 blocks on level ground (2-3 mph) Light work around house (dust, dishes)     Other Risk Factors:   Screening for ETOH use:  Done and low risk  Smoking status:  Never     Personal or FH of bleeding problems:  No  Personal or FH of blood clots:  No  Personal or FH of anesthesia problems:   No    Pulmonary Risk:  Asthma or COPD:  No  Body mass index is 31 kg/m². Known ERICH:  No  Albumin normal, BUN normal    Past Medical, Surgical, Social History     Allergies: Allergies   Allergen Reactions    Latex Contact Dermatitis       Medication Documentation Review Audit     Reviewed by Tessa Flowers RN (Registered Nurse) on 06/09/20 at 1000    Medication Sig Documenting Provider Last Dose Status Taking?   acetaminophen (TYLENOL EXTRA STRENGTH) 500 mg tablet Take  by mouth every six (6) hours as needed for Pain. Provider, Historical  Active Yes   amLODIPine (NORVASC) 5 mg tablet Take 5 mg by mouth daily. Provider, Historical  Active Yes   ergocalciferol (ERGOCALCIFEROL) 1,250 mcg (50,000 unit) capsule Take 50,000 Units by mouth every seven (7) days. Provider, Historical  Active Yes                Past Medical History:   Diagnosis Date    Breast cancer ([facility] [address] 2014    H/O partial adrenalectomy ([facility] [address]     HTN (hypertension)     Low vitamin D level     Obesity, Class I, BMI 30-34.9     Paget disease of bone     Prediabetes 01/20/2020    5.9    SBO (small bowel obstruction) ([facility] [address] 1995     Past Surgical History:   Procedure Laterality Date    HX ABDOMINAL LAPAROSCOPY      reports having mass removed near kidney (adrenal gland) to address HTN.      HX BLADDER SUSPENSION  2019    HX BREAST LUMPECTOMY Left 2014    HX CATARACT REMOVAL Bilateral     HX COLECTOMY  1995    HX CYST REMOVAL      Eyelid    HX HEENT  03/2020    All teeth pulled    HX KNEE REPLACEMENT Left 1998     Social History     Tobacco Use    Smoking status: Never Smoker    Smokeless tobacco: Never Used   Substance Use Topics    Alcohol use: No    Drug use: Never     Family History   Problem Relation Age of Onset    Diabetes Mother     Lung Cancer Mother     Hypertension Mother     Lung Cancer Father     Anesth Problems Neg Hx     Deep Vein Thrombosis Neg Hx        Objective     Vitals:    06/09/20 0930 06/09/20 1021   BP: (!) 184/95 150/76   Pulse: 68    Resp: 16    Temp: 97.9 °F (36.6 °C)    SpO2: 98%    Weight: 79.4 kg (175 lb)    Height: 5' 3\" (1.6 m)        Constitutional:  Appears well,  No Acute Distress, Vitals noted  Psychiatric:   Affect normal, Alert and Oriented to person/place/time    Eyes:   Pupils equally round and reactive, EOMI, conjunctiva clear, eyelids normal  ENT:   External ears and nose normal, teeth abnormal, gums normal, TMs and Orophyarynx normal  Neck:   General inspection and Thyroid normal.  No abnormal cervical or supraclavicular nodes    Lungs:   Clear to auscultation, good respiratory effort  Heart: Ausculation normal.  Regular rhythm. No cardiac murmurs.   No carotid bruits or palpable thrills  Chest wall normal  Musculoskeletal: Limited ROM to shoulder  Extremities:   Without edema, good peripheral pulses  Skin:   Warm to palpation, without rashes, bruising, or suspicious lesions     Recent Results (from the past 72 hour(s))   TYPE & SCREEN    Collection Time: 06/09/20 10:24 AM   Result Value Ref Range    Crossmatch Expiration 06/23/2020     ABO/Rh(D) Bruna Gni POSITIVE     Antibody screen NEG    CBC WITH AUTOMATED DIFF    Collection Time: 06/09/20 10:24 AM   Result Value Ref Range    WBC 4.9 3.6 - 11.0 K/uL    RBC 4.86 3.80 - 5.20 M/uL    HGB 12.7 11.5 - 16.0 g/dL    HCT 40.3 35.0 - 47.0 %    MCV 82.9 80.0 - 99.0 FL    MCH 26.1 26.0 - 34.0 PG    MCHC 31.5 30.0 - 36.5 g/dL    RDW 14.4 11.5 - 14.5 %    PLATELET 501 223 - 253 K/uL    MPV 10.2 8.9 - 12.9 FL    NRBC 0.0 0  WBC    ABSOLUTE NRBC 0.00 0.00 - 0.01 K/uL    NEUTROPHILS 44 32 - 75 %    LYMPHOCYTES 39 12 - 49 %    MONOCYTES 9 5 - 13 %    EOSINOPHILS 7 0 - 7 %    BASOPHILS 1 0 - 1 %    IMMATURE GRANULOCYTES 0 0.0 - 0.5 %    ABS. NEUTROPHILS 2.2 1.8 - 8.0 K/UL    ABS. LYMPHOCYTES 1.9 0.8 - 3.5 K/UL    ABS. MONOCYTES 0.4 0.0 - 1.0 K/UL    ABS. EOSINOPHILS 0.3 0.0 - 0.4 K/UL    ABS. BASOPHILS 0.0 0.0 - 0.1 K/UL    ABS. IMM. GRANS. 0.0 0.00 - 0.04 K/UL    DF AUTOMATED     METABOLIC PANEL, COMPREHENSIVE    Collection Time: 06/09/20 10:24 AM   Result Value Ref Range    Sodium 141 136 - 145 mmol/L    Potassium 4.5 3.5 - 5.1 mmol/L    Chloride 109 (H) 97 - 108 mmol/L    CO2 27 21 - 32 mmol/L    Anion gap 5 5 - 15 mmol/L    Glucose 87 65 - 100 mg/dL    BUN 13 6 - 20 MG/DL    Creatinine 0.76 0.55 - 1.02 MG/DL    BUN/Creatinine ratio 17 12 - 20      GFR est AA >60 >60 ml/min/1.73m2    GFR est non-AA >60 >60 ml/min/1.73m2    Calcium 9.4 8.5 - 10.1 MG/DL    Bilirubin, total 0.7 0.2 - 1.0 MG/DL    ALT (SGPT) 24 12 - 78 U/L    AST (SGOT) 19 15 - 37 U/L    Alk. phosphatase 224 (H) 45 - 117 U/L    Protein, total 7.3 6.4 - 8.2 g/dL    Albumin 3.8 3.5 - 5.0 g/dL    Globulin 3.5 2.0 - 4.0 g/dL    A-G Ratio 1.1 1.1 - 2.2     HEMOGLOBIN A1C WITH EAG    Collection Time: 06/09/20 10:24 AM   Result Value Ref Range    Hemoglobin A1c 5.6 4.0 - 5.6 %    Est. average glucose 114 mg/dL   CULTURE, MRSA    Collection Time: 06/09/20 10:24 AM   Result Value Ref Range    Special Requests: NO SPECIAL REQUESTS      Culture result: MRSA NOT PRESENT      Culture result:            Screening of patient nares for MRSA is for surveillance purposes and, if positive, to facilitate isolation considerations in high risk settings. It is not intended for automatic decolonization interventions per se as regimens are not sufficiently effective to warrant routine use.    PROTHROMBIN TIME + INR    Collection Time: 06/09/20 10:24 AM   Result Value Ref Range    INR 0.9 0.9 - 1.1      Prothrombin time 9.8 9.0 - 11.1 sec   PTT    Collection Time: 06/09/20 10:24 AM   Result Value Ref Range    aPTT 26.9 22.1 - 32.0 sec    aPTT, therapeutic range     58.0 - 77.0 SECS   URINALYSIS W/ REFLEX CULTURE    Collection Time: 06/09/20 10:35 AM   Result Value Ref Range    Color YELLOW/STRAW      Appearance CLEAR CLEAR      Specific gravity 1.016 1.003 - 1.030      pH (UA) 5.0 5.0 - 8.0      Protein Negative NEG mg/dL    Glucose Negative NEG mg/dL    Ketone Negative NEG mg/dL    Bilirubin Negative NEG      Blood Negative NEG      Urobilinogen 0.2 0.2 - 1.0 EU/dL    Nitrites Positive (A) NEG      Leukocyte Esterase TRACE (A) NEG      WBC 5-10 0 - 4 /hpf    RBC 0-5 0 - 5 /hpf    Epithelial cells FEW FEW /lpf    Bacteria 4+ (A) NEG /hpf    UA:UC IF INDICATED CULTURE NOT INDICATED BY UA RESULT CNI      Hyaline cast 0-2 0 - 5 /lpf       Assessment and Plan     Assessment/Plan:   1) OA Left Shoulder  2) Pre-Operative Evaluation    Labs and EKG reviewed. MRSA negative    Discharge plan: Ms. Kelly Jasso has notified us that she has no one to stay with her after surgery. She was hoping to stay several days in the hospital. She keeps stating, \"I will be fine\". She has no family that lives here and will be having a friend bring her and . She became tearful talking about it as she is scared her surgery will be cancelled. Text sent to [facility] with surgeon's office and they will be contacting her. Preoperative Clearance  Per RCRI, the patient has a 0.4% risk of cardiac death, nonfatal MI, nonfatal cardiac arrest based on no risk factors. Per ACC/AHA guidelines, patient is low risk for a(n) intermediate risk surgery and may proceed to planned surgery with the above noted risk.     Dariana Hinds NP

## 2020-06-11 NOTE — H&P (VIEW-ONLY)
Preoperative Evaluation                     History and Physical with Surgical Risk Stratification       6/9/2020    CC: Left shoulder pain  Surgery: LRTS     HPI:   Rachel Platt is a 78 y.o. female referred for pre-operative evaluation by Dr. Lili Raphael for surgery on 6/22/20. Ms. Yuly Chávez is here today after her surgery was cancelled in January r/t dental issues. She has since had all her teeth pulled and is awaiting dentures. She notes no new health concerns. Denies CP or SOB. For full pain history see prior H/P. The patient was evaluated in the surgeon's office and it was determined that the most appropriate plan of care is to proceed with surgical intervention. Patient's PCP Zachary Starkey DO    Review of Systems     Constitutional: Negative for chills and fever  HENT: Negative for congestion and sore throat  Eyes: negative for blurred vision and double vision  Respiratory: Negative for cough, shortness of breath and wheezing  Mouth: No teeth  Cardiovascular: Negative for chest pain and palpitations  Gastrointestinal: Negative for abdominal pain, constipation, diarrhea and nausea  Genitourinary: Negative for dysuria and hematuria  Musculoskeletal: Shoulder pain  Skin: Negative for rash, open wounds. Negative for bruises easily  Neurological: Negative for dizziness, tremors and headaches  Psychiatric: Negative for depression. The patient is not nervous/anxious.     Inherent Risk of Surgery     Surgical risk:  Intermediate  Low:  EIntermediate:  Joint Replacement High:    Patient Cardiac Risk Assessment     Revised Cardiac Risk Index (RCRI)    Rate if cardiac death, nonfatal MI, nonfatal cardiac arrest by number of risk factor- 0.4%    CHRISTAL/AHA 2007 Guidelines:   1) Surgery Emergency, Non-cardiac -> to surgery  2) If not, look at clinical predictors    Major Intermediate Minor       Blood Thinner: NA    METS      EQUAL TO 4 Care for self Walk indoors around house Walk 2-3 blocks on level ground (2-3 mph) Light work around house (dust, dishes)     Other Risk Factors:   Screening for ETOH use:  Done and low risk  Smoking status:  Never     Personal or FH of bleeding problems:  No  Personal or FH of blood clots:  No  Personal or FH of anesthesia problems:   No    Pulmonary Risk:  Asthma or COPD:  No  Body mass index is 31 kg/m². Known ERICH:  No  Albumin normal, BUN normal    Past Medical, Surgical, Social History     Allergies: Allergies   Allergen Reactions    Latex Contact Dermatitis       Medication Documentation Review Audit     Reviewed by Raven Flores RN (Registered Nurse) on 06/09/20 at 1000    Medication Sig Documenting Provider Last Dose Status Taking?   acetaminophen (TYLENOL EXTRA STRENGTH) 500 mg tablet Take  by mouth every six (6) hours as needed for Pain. Provider, Historical  Active Yes   amLODIPine (NORVASC) 5 mg tablet Take 5 mg by mouth daily. Provider, Historical  Active Yes   ergocalciferol (ERGOCALCIFEROL) 1,250 mcg (50,000 unit) capsule Take 50,000 Units by mouth every seven (7) days. Provider, Historical  Active Yes                Past Medical History:   Diagnosis Date    Breast cancer ([facility] [address] 2014    H/O partial adrenalectomy ([facility] [address]     HTN (hypertension)     Low vitamin D level     Obesity, Class I, BMI 30-34.9     Paget disease of bone     Prediabetes 01/20/2020    5.9    SBO (small bowel obstruction) ([facility] [address] 1995     Past Surgical History:   Procedure Laterality Date    HX ABDOMINAL LAPAROSCOPY      reports having mass removed near kidney (adrenal gland) to address HTN.      HX BLADDER SUSPENSION  2019    HX BREAST LUMPECTOMY Left 2014    HX CATARACT REMOVAL Bilateral     HX COLECTOMY  1995    HX CYST REMOVAL      Eyelid    HX HEENT  03/2020    All teeth pulled    HX KNEE REPLACEMENT Left 1998     Social History     Tobacco Use    Smoking status: Never Smoker    Smokeless tobacco: Never Used   Substance Use Topics    Alcohol use: No    Drug use: Never     Family History   Problem Relation Age of Onset    Diabetes Mother     Lung Cancer Mother     Hypertension Mother     Lung Cancer Father     Anesth Problems Neg Hx     Deep Vein Thrombosis Neg Hx        Objective     Vitals:    06/09/20 0930 06/09/20 1021   BP: (!) 184/95 150/76   Pulse: 68    Resp: 16    Temp: 97.9 °F (36.6 °C)    SpO2: 98%    Weight: 79.4 kg (175 lb)    Height: 5' 3\" (1.6 m)        Constitutional:  Appears well,  No Acute Distress, Vitals noted  Psychiatric:   Affect normal, Alert and Oriented to person/place/time    Eyes:   Pupils equally round and reactive, EOMI, conjunctiva clear, eyelids normal  ENT:   External ears and nose normal, teeth abnormal, gums normal, TMs and Orophyarynx normal  Neck:   General inspection and Thyroid normal.  No abnormal cervical or supraclavicular nodes    Lungs:   Clear to auscultation, good respiratory effort  Heart: Ausculation normal.  Regular rhythm. No cardiac murmurs.   No carotid bruits or palpable thrills  Chest wall normal  Musculoskeletal: Limited ROM to shoulder  Extremities:   Without edema, good peripheral pulses  Skin:   Warm to palpation, without rashes, bruising, or suspicious lesions     Recent Results (from the past 72 hour(s))   TYPE & SCREEN    Collection Time: 06/09/20 10:24 AM   Result Value Ref Range    Crossmatch Expiration 06/23/2020     ABO/Rh(D) Juhi Mendes POSITIVE     Antibody screen NEG    CBC WITH AUTOMATED DIFF    Collection Time: 06/09/20 10:24 AM   Result Value Ref Range    WBC 4.9 3.6 - 11.0 K/uL    RBC 4.86 3.80 - 5.20 M/uL    HGB 12.7 11.5 - 16.0 g/dL    HCT 40.3 35.0 - 47.0 %    MCV 82.9 80.0 - 99.0 FL    MCH 26.1 26.0 - 34.0 PG    MCHC 31.5 30.0 - 36.5 g/dL    RDW 14.4 11.5 - 14.5 %    PLATELET 130 047 - 466 K/uL    MPV 10.2 8.9 - 12.9 FL    NRBC 0.0 0  WBC    ABSOLUTE NRBC 0.00 0.00 - 0.01 K/uL    NEUTROPHILS 44 32 - 75 %    LYMPHOCYTES 39 12 - 49 %    MONOCYTES 9 5 - 13 %    EOSINOPHILS 7 0 - 7 %    BASOPHILS 1 0 - 1 %    IMMATURE GRANULOCYTES 0 0.0 - 0.5 %    ABS. NEUTROPHILS 2.2 1.8 - 8.0 K/UL    ABS. LYMPHOCYTES 1.9 0.8 - 3.5 K/UL    ABS. MONOCYTES 0.4 0.0 - 1.0 K/UL    ABS. EOSINOPHILS 0.3 0.0 - 0.4 K/UL    ABS. BASOPHILS 0.0 0.0 - 0.1 K/UL    ABS. IMM. GRANS. 0.0 0.00 - 0.04 K/UL    DF AUTOMATED     METABOLIC PANEL, COMPREHENSIVE    Collection Time: 06/09/20 10:24 AM   Result Value Ref Range    Sodium 141 136 - 145 mmol/L    Potassium 4.5 3.5 - 5.1 mmol/L    Chloride 109 (H) 97 - 108 mmol/L    CO2 27 21 - 32 mmol/L    Anion gap 5 5 - 15 mmol/L    Glucose 87 65 - 100 mg/dL    BUN 13 6 - 20 MG/DL    Creatinine 0.76 0.55 - 1.02 MG/DL    BUN/Creatinine ratio 17 12 - 20      GFR est AA >60 >60 ml/min/1.73m2    GFR est non-AA >60 >60 ml/min/1.73m2    Calcium 9.4 8.5 - 10.1 MG/DL    Bilirubin, total 0.7 0.2 - 1.0 MG/DL    ALT (SGPT) 24 12 - 78 U/L    AST (SGOT) 19 15 - 37 U/L    Alk. phosphatase 224 (H) 45 - 117 U/L    Protein, total 7.3 6.4 - 8.2 g/dL    Albumin 3.8 3.5 - 5.0 g/dL    Globulin 3.5 2.0 - 4.0 g/dL    A-G Ratio 1.1 1.1 - 2.2     HEMOGLOBIN A1C WITH EAG    Collection Time: 06/09/20 10:24 AM   Result Value Ref Range    Hemoglobin A1c 5.6 4.0 - 5.6 %    Est. average glucose 114 mg/dL   CULTURE, MRSA    Collection Time: 06/09/20 10:24 AM   Result Value Ref Range    Special Requests: NO SPECIAL REQUESTS      Culture result: MRSA NOT PRESENT      Culture result:            Screening of patient nares for MRSA is for surveillance purposes and, if positive, to facilitate isolation considerations in high risk settings. It is not intended for automatic decolonization interventions per se as regimens are not sufficiently effective to warrant routine use.    PROTHROMBIN TIME + INR    Collection Time: 06/09/20 10:24 AM   Result Value Ref Range    INR 0.9 0.9 - 1.1      Prothrombin time 9.8 9.0 - 11.1 sec   PTT    Collection Time: 06/09/20 10:24 AM   Result Value Ref Range    aPTT 26.9 22.1 - 32.0 sec    aPTT, therapeutic range     58.0 - 77.0 SECS   URINALYSIS W/ REFLEX CULTURE    Collection Time: 06/09/20 10:35 AM   Result Value Ref Range    Color YELLOW/STRAW      Appearance CLEAR CLEAR      Specific gravity 1.016 1.003 - 1.030      pH (UA) 5.0 5.0 - 8.0      Protein Negative NEG mg/dL    Glucose Negative NEG mg/dL    Ketone Negative NEG mg/dL    Bilirubin Negative NEG      Blood Negative NEG      Urobilinogen 0.2 0.2 - 1.0 EU/dL    Nitrites Positive (A) NEG      Leukocyte Esterase TRACE (A) NEG      WBC 5-10 0 - 4 /hpf    RBC 0-5 0 - 5 /hpf    Epithelial cells FEW FEW /lpf    Bacteria 4+ (A) NEG /hpf    UA:UC IF INDICATED CULTURE NOT INDICATED BY UA RESULT CNI      Hyaline cast 0-2 0 - 5 /lpf       Assessment and Plan     Assessment/Plan:   1) OA Left Shoulder  2) Pre-Operative Evaluation    Labs and EKG reviewed. MRSA negative    Discharge plan: Ms. Bairon Earl has notified us that she has no one to stay with her after surgery. She was hoping to stay several days in the hospital. She keeps stating, \"I will be fine\". She has no family that lives here and will be having a friend bring her and . She became tearful talking about it as she is scared her surgery will be cancelled. Text sent to Chanda Pearce with surgeon's office and they will be contacting her. Preoperative Clearance  Per RCRI, the patient has a 0.4% risk of cardiac death, nonfatal MI, nonfatal cardiac arrest based on no risk factors. Per ACC/AHA guidelines, patient is low risk for a(n) intermediate risk surgery and may proceed to planned surgery with the above noted risk.     Katelin Rasmussen NP

## 2020-06-18 ENCOUNTER — HOSPITAL ENCOUNTER (OUTPATIENT)
Dept: PREADMISSION TESTING | Age: 70
Discharge: HOME | End: 2020-06-18
Payer: MEDICARE

## 2020-06-18 PROCEDURE — 87635 SARS-COV-2 COVID-19 AMP PRB: CPT

## 2020-06-19 ENCOUNTER — ANESTHESIA EVENT (OUTPATIENT)
Dept: SURGERY | Age: 70
DRG: 483 | End: 2020-06-19
Payer: MEDICARE

## 2020-06-19 LAB — SARS-COV-2, COV2NT: NOT DETECTED

## 2020-06-19 NOTE — PERIOP NOTES
Spoke to Radha Rey at Dr. Karlos Delgado office inquiring if the patient will be a \"fast track\" patient as the orders state, or admitted since the patient states that she has no one to care for her after her surgery. Per Radha Rey, the patient's granddaughter is supposed to be taking care of her. She will contact her granddaughter to confirm this. Spoke to Zamzam Lopez in the [address] requesting that \"fast track\" be added to the surgical posting.   DOS: 6/22/2020

## 2020-06-22 ENCOUNTER — ANESTHESIA (OUTPATIENT)
Dept: SURGERY | Age: 70
DRG: 483 | End: 2020-06-22
Payer: MEDICARE

## 2020-06-22 ENCOUNTER — HOSPITAL ENCOUNTER (INPATIENT)
Age: 70
LOS: 1 days | Discharge: HOME | DRG: 483 | End: 2020-06-22
Attending: ORTHOPAEDIC SURGERY | Admitting: ORTHOPAEDIC SURGERY
Payer: MEDICARE

## 2020-06-22 ENCOUNTER — APPOINTMENT (OUTPATIENT)
Dept: GENERAL RADIOLOGY | Age: 70
DRG: 483 | End: 2020-06-22
Attending: ORTHOPAEDIC SURGERY
Payer: MEDICARE

## 2020-06-22 VITALS
SYSTOLIC BLOOD PRESSURE: 143 MMHG | HEART RATE: 86 BPM | OXYGEN SATURATION: 97 % | RESPIRATION RATE: 16 BRPM | WEIGHT: 175 LBS | DIASTOLIC BLOOD PRESSURE: 84 MMHG | BODY MASS INDEX: 31 KG/M2 | TEMPERATURE: 97.1 F

## 2020-06-22 DIAGNOSIS — M19.019 SHOULDER ARTHRITIS: Primary | ICD-10-CM

## 2020-06-22 PROCEDURE — 74011250636 HC RX REV CODE- 250/636: Performed by: NURSE ANESTHETIST, CERTIFIED REGISTERED

## 2020-06-22 PROCEDURE — 74011000272 HC RX REV CODE- 272: Performed by: ORTHOPAEDIC SURGERY

## 2020-06-22 PROCEDURE — L3670 SO ACRO/CLAV CAN WEB PRE OTS: HCPCS | Performed by: ORTHOPAEDIC SURGERY

## 2020-06-22 PROCEDURE — C1776 JOINT DEVICE (IMPLANTABLE): HCPCS | Performed by: ORTHOPAEDIC SURGERY

## 2020-06-22 PROCEDURE — 74011250636 HC RX REV CODE- 250/636: Performed by: ANESTHESIOLOGY

## 2020-06-22 PROCEDURE — C1713 ANCHOR/SCREW BN/BN,TIS/BN: HCPCS | Performed by: ORTHOPAEDIC SURGERY

## 2020-06-22 PROCEDURE — 65270000029 HC RM PRIVATE

## 2020-06-22 PROCEDURE — 77030040922 HC BLNKT HYPOTHRM STRY -A

## 2020-06-22 PROCEDURE — 74011000250 HC RX REV CODE- 250: Performed by: NURSE ANESTHETIST, CERTIFIED REGISTERED

## 2020-06-22 PROCEDURE — 77030031139 HC SUT VCRL2 J&J -A: Performed by: ORTHOPAEDIC SURGERY

## 2020-06-22 PROCEDURE — 76060000034 HC ANESTHESIA 1.5 TO 2 HR: Performed by: ORTHOPAEDIC SURGERY

## 2020-06-22 PROCEDURE — 77030002922 HC SUT FBRWRE ARTH -B: Performed by: ORTHOPAEDIC SURGERY

## 2020-06-22 PROCEDURE — 77030006835 HC BLD SAW SAG STRY -B: Performed by: ORTHOPAEDIC SURGERY

## 2020-06-22 PROCEDURE — 77030018547 HC SUT ETHBND1 J&J -B: Performed by: ORTHOPAEDIC SURGERY

## 2020-06-22 PROCEDURE — 77030026438 HC STYL ET INTUB CARD -A: Performed by: NURSE ANESTHETIST, CERTIFIED REGISTERED

## 2020-06-22 PROCEDURE — 77030032497 HC WRP SHLDR WO BGS SOLM -B

## 2020-06-22 PROCEDURE — 77030020471 HC BIT DRL CREV ZIMM -C: Performed by: ORTHOPAEDIC SURGERY

## 2020-06-22 PROCEDURE — 77030010507 HC ADH SKN DERMBND J&J -B: Performed by: ORTHOPAEDIC SURGERY

## 2020-06-22 PROCEDURE — 77030028700 HC BLD TISS PLSM MEDT -E: Performed by: ORTHOPAEDIC SURGERY

## 2020-06-22 PROCEDURE — 76210000023 HC REC RM PH II 2 TO 2.5 HR: Performed by: ORTHOPAEDIC SURGERY

## 2020-06-22 PROCEDURE — C9290 INJ, BUPIVACAINE LIPOSOME: HCPCS | Performed by: ANESTHESIOLOGY

## 2020-06-22 PROCEDURE — 74011250636 HC RX REV CODE- 250/636: Performed by: ORTHOPAEDIC SURGERY

## 2020-06-22 PROCEDURE — 77030018836 HC SOL IRR NACL ICUM -A: Performed by: ORTHOPAEDIC SURGERY

## 2020-06-22 PROCEDURE — 74011000250 HC RX REV CODE- 250: Performed by: ORTHOPAEDIC SURGERY

## 2020-06-22 PROCEDURE — 77030040361 HC SLV COMPR DVT MDII -B

## 2020-06-22 PROCEDURE — 77030002933 HC SUT MCRYL J&J -A: Performed by: ORTHOPAEDIC SURGERY

## 2020-06-22 PROCEDURE — 74011000250 HC RX REV CODE- 250

## 2020-06-22 PROCEDURE — 77030011640 HC PAD GRND REM COVD -A: Performed by: ORTHOPAEDIC SURGERY

## 2020-06-22 PROCEDURE — 76210000016 HC OR PH I REC 1 TO 1.5 HR: Performed by: ORTHOPAEDIC SURGERY

## 2020-06-22 PROCEDURE — 77030013708 HC HNDPC SUC IRR PULS STRY –B: Performed by: ORTHOPAEDIC SURGERY

## 2020-06-22 PROCEDURE — 76010000162 HC OR TIME 1.5 TO 2 HR INTENSV-TIER 1: Performed by: ORTHOPAEDIC SURGERY

## 2020-06-22 PROCEDURE — 77030008684 HC TU ET CUF COVD -B: Performed by: NURSE ANESTHETIST, CERTIFIED REGISTERED

## 2020-06-22 PROCEDURE — 73020 X-RAY EXAM OF SHOULDER: CPT

## 2020-06-22 PROCEDURE — 64415 NJX AA&/STRD BRCH PLXS IMG: CPT

## 2020-06-22 PROCEDURE — 0RRK00Z REPLACEMENT OF LEFT SHOULDER JOINT WITH REVERSE BALL AND SOCKET SYNTHETIC SUBSTITUTE, OPEN APPROACH: ICD-10-PCS | Performed by: ORTHOPAEDIC SURGERY

## 2020-06-22 PROCEDURE — 77030003601 HC NDL NRV BLK BBMI -A

## 2020-06-22 PROCEDURE — 77030018673: Performed by: ORTHOPAEDIC SURGERY

## 2020-06-22 PROCEDURE — 74011250636 HC RX REV CODE- 250/636

## 2020-06-22 DEVICE — BASEPLT W/ADAPTER GLEN 25MM -- COMPREHENSIVE: Type: IMPLANTABLE DEVICE | Site: SHOULDER | Status: FUNCTIONAL

## 2020-06-22 DEVICE — IMPLANTABLE DEVICE
Type: IMPLANTABLE DEVICE | Site: SHOULDER | Status: FUNCTIONAL
Brand: COMPREHENSIVE® REVERSE SHOULDER

## 2020-06-22 DEVICE — SHOULDR S3 TOT ADV REVRS IMPL CAPPED S3: Type: IMPLANTABLE DEVICE | Status: FUNCTIONAL

## 2020-06-22 DEVICE — GLENOSPHERE RVS STD 0D 36MM -- COMPREHENSIVE: Type: IMPLANTABLE DEVICE | Site: SHOULDER | Status: FUNCTIONAL

## 2020-06-22 DEVICE — TRAY HUM STD 40MM DIA -- COMPREHENSIVE: Type: IMPLANTABLE DEVICE | Site: SHOULDER | Status: FUNCTIONAL

## 2020-06-22 DEVICE — STEM HUM PRI MINI 12MM -- COMPREHENSIVE: Type: IMPLANTABLE DEVICE | Site: SHOULDER | Status: FUNCTIONAL

## 2020-06-22 RX ORDER — SODIUM CHLORIDE 0.9 % (FLUSH) 0.9 %
5-40 SYRINGE (ML) INJECTION AS NEEDED
Status: DISCONTINUED | OUTPATIENT
Start: 2020-06-22 | End: 2020-06-22 | Stop reason: HOSPADM

## 2020-06-22 RX ORDER — HYDROMORPHONE HYDROCHLORIDE 1 MG/ML
0.5 INJECTION, SOLUTION INTRAMUSCULAR; INTRAVENOUS; SUBCUTANEOUS
Status: CANCELLED | OUTPATIENT
Start: 2020-06-22 | End: 2020-06-23

## 2020-06-22 RX ORDER — PROPOFOL 10 MG/ML
INJECTION, EMULSION INTRAVENOUS AS NEEDED
Status: DISCONTINUED | OUTPATIENT
Start: 2020-06-22 | End: 2020-06-22 | Stop reason: HOSPADM

## 2020-06-22 RX ORDER — ACETAMINOPHEN 325 MG/1
650 TABLET ORAL EVERY 6 HOURS
Status: CANCELLED | OUTPATIENT
Start: 2020-06-22

## 2020-06-22 RX ORDER — HYDROMORPHONE HYDROCHLORIDE 1 MG/ML
.25-1 INJECTION, SOLUTION INTRAMUSCULAR; INTRAVENOUS; SUBCUTANEOUS
Status: DISCONTINUED | OUTPATIENT
Start: 2020-06-22 | End: 2020-06-23 | Stop reason: HOSPADM

## 2020-06-22 RX ORDER — SULFAMETHOXAZOLE AND TRIMETHOPRIM 800; 160 MG/1; MG/1
1 TABLET ORAL EVERY 12 HOURS
Qty: 6 TAB | Refills: 0 | Status: SHIPPED
Start: 2020-06-22 | End: 2020-06-25

## 2020-06-22 RX ORDER — SODIUM CHLORIDE 0.9 % (FLUSH) 0.9 %
5-40 SYRINGE (ML) INJECTION EVERY 8 HOURS
Status: DISCONTINUED | OUTPATIENT
Start: 2020-06-22 | End: 2020-06-22 | Stop reason: HOSPADM

## 2020-06-22 RX ORDER — SODIUM CHLORIDE 0.9 % (FLUSH) 0.9 %
5-40 SYRINGE (ML) INJECTION AS NEEDED
Status: CANCELLED | OUTPATIENT
Start: 2020-06-22

## 2020-06-22 RX ORDER — GLYCOPYRROLATE 0.2 MG/ML
INJECTION INTRAMUSCULAR; INTRAVENOUS AS NEEDED
Status: DISCONTINUED | OUTPATIENT
Start: 2020-06-22 | End: 2020-06-22 | Stop reason: HOSPADM

## 2020-06-22 RX ORDER — PHENYLEPHRINE HCL IN 0.9% NACL 0.4MG/10ML
SYRINGE (ML) INTRAVENOUS AS NEEDED
Status: DISCONTINUED | OUTPATIENT
Start: 2020-06-22 | End: 2020-06-22 | Stop reason: HOSPADM

## 2020-06-22 RX ORDER — MIDAZOLAM HYDROCHLORIDE 1 MG/ML
INJECTION, SOLUTION INTRAMUSCULAR; INTRAVENOUS AS NEEDED
Status: DISCONTINUED | OUTPATIENT
Start: 2020-06-22 | End: 2020-06-22 | Stop reason: HOSPADM

## 2020-06-22 RX ORDER — TRAMADOL HYDROCHLORIDE 50 MG/1
50 TABLET ORAL
Status: CANCELLED | OUTPATIENT
Start: 2020-06-22

## 2020-06-22 RX ORDER — ONDANSETRON 2 MG/ML
INJECTION INTRAMUSCULAR; INTRAVENOUS AS NEEDED
Status: DISCONTINUED | OUTPATIENT
Start: 2020-06-22 | End: 2020-06-22 | Stop reason: HOSPADM

## 2020-06-22 RX ORDER — HYDROMORPHONE HYDROCHLORIDE 2 MG/ML
INJECTION, SOLUTION INTRAMUSCULAR; INTRAVENOUS; SUBCUTANEOUS AS NEEDED
Status: DISCONTINUED | OUTPATIENT
Start: 2020-06-22 | End: 2020-06-22

## 2020-06-22 RX ORDER — FENTANYL CITRATE 50 UG/ML
INJECTION, SOLUTION INTRAMUSCULAR; INTRAVENOUS AS NEEDED
Status: DISCONTINUED | OUTPATIENT
Start: 2020-06-22 | End: 2020-06-22 | Stop reason: HOSPADM

## 2020-06-22 RX ORDER — SUCCINYLCHOLINE CHLORIDE 20 MG/ML
INJECTION INTRAMUSCULAR; INTRAVENOUS AS NEEDED
Status: DISCONTINUED | OUTPATIENT
Start: 2020-06-22 | End: 2020-06-22 | Stop reason: HOSPADM

## 2020-06-22 RX ORDER — OXYCODONE HYDROCHLORIDE 5 MG/1
5 TABLET ORAL
Status: CANCELLED | OUTPATIENT
Start: 2020-06-22

## 2020-06-22 RX ORDER — ROCURONIUM BROMIDE 10 MG/ML
INJECTION, SOLUTION INTRAVENOUS AS NEEDED
Status: DISCONTINUED | OUTPATIENT
Start: 2020-06-22 | End: 2020-06-22 | Stop reason: HOSPADM

## 2020-06-22 RX ORDER — EPHEDRINE SULFATE/0.9% NACL/PF 50 MG/5 ML
SYRINGE (ML) INTRAVENOUS AS NEEDED
Status: DISCONTINUED | OUTPATIENT
Start: 2020-06-22 | End: 2020-06-22 | Stop reason: HOSPADM

## 2020-06-22 RX ORDER — FAMOTIDINE 20 MG/1
20 TABLET, FILM COATED ORAL 2 TIMES DAILY
Status: CANCELLED | OUTPATIENT
Start: 2020-06-22

## 2020-06-22 RX ORDER — AMOXICILLIN 250 MG
1 CAPSULE ORAL 2 TIMES DAILY
Status: CANCELLED | OUTPATIENT
Start: 2020-06-22

## 2020-06-22 RX ORDER — VANCOMYCIN HYDROCHLORIDE 1 G/20ML
INJECTION, POWDER, LYOPHILIZED, FOR SOLUTION INTRAVENOUS AS NEEDED
Status: DISCONTINUED | OUTPATIENT
Start: 2020-06-22 | End: 2020-06-22 | Stop reason: HOSPADM

## 2020-06-22 RX ORDER — NALOXONE HYDROCHLORIDE 0.4 MG/ML
0.4 INJECTION, SOLUTION INTRAMUSCULAR; INTRAVENOUS; SUBCUTANEOUS AS NEEDED
Status: CANCELLED | OUTPATIENT
Start: 2020-06-22

## 2020-06-22 RX ORDER — SODIUM CHLORIDE 9 MG/ML
125 INJECTION, SOLUTION INTRAVENOUS CONTINUOUS
Status: CANCELLED | OUTPATIENT
Start: 2020-06-22 | End: 2020-06-23

## 2020-06-22 RX ORDER — ASPIRIN 325 MG
325 TABLET ORAL 2 TIMES DAILY WITH MEALS
Qty: 28 TAB | Refills: 0 | Status: SHIPPED
Start: 2020-06-22 | End: 2020-07-06

## 2020-06-22 RX ORDER — ASPIRIN 325 MG
325 TABLET, DELAYED RELEASE (ENTERIC COATED) ORAL 2 TIMES DAILY
Status: CANCELLED | OUTPATIENT
Start: 2020-06-22

## 2020-06-22 RX ORDER — SODIUM CHLORIDE, SODIUM LACTATE, POTASSIUM CHLORIDE, CALCIUM CHLORIDE 600; 310; 30; 20 MG/100ML; MG/100ML; MG/100ML; MG/100ML
125 INJECTION, SOLUTION INTRAVENOUS CONTINUOUS
Status: DISCONTINUED | OUTPATIENT
Start: 2020-06-22 | End: 2020-06-22 | Stop reason: HOSPADM

## 2020-06-22 RX ORDER — POLYETHYLENE GLYCOL 3350 17 G/17G
17 POWDER, FOR SOLUTION ORAL DAILY
Status: CANCELLED | OUTPATIENT
Start: 2020-06-22

## 2020-06-22 RX ORDER — SODIUM CHLORIDE 0.9 % (FLUSH) 0.9 %
5-40 SYRINGE (ML) INJECTION EVERY 8 HOURS
Status: CANCELLED | OUTPATIENT
Start: 2020-06-22

## 2020-06-22 RX ORDER — BUPIVACAINE HYDROCHLORIDE 2.5 MG/ML
INJECTION, SOLUTION EPIDURAL; INFILTRATION; INTRACAUDAL AS NEEDED
Status: DISCONTINUED | OUTPATIENT
Start: 2020-06-22 | End: 2020-06-22 | Stop reason: HOSPADM

## 2020-06-22 RX ORDER — OXYCODONE AND ACETAMINOPHEN 5; 325 MG/1; MG/1
1 TABLET ORAL
Qty: 42 TAB | Refills: 0 | Status: SHIPPED
Start: 2020-06-22 | End: 2020-06-29

## 2020-06-22 RX ORDER — CEFAZOLIN SODIUM 1 G/3ML
INJECTION, POWDER, FOR SOLUTION INTRAMUSCULAR; INTRAVENOUS AS NEEDED
Status: DISCONTINUED | OUTPATIENT
Start: 2020-06-22 | End: 2020-06-22 | Stop reason: HOSPADM

## 2020-06-22 RX ORDER — DIPHENHYDRAMINE HCL 12.5MG/5ML
12.5 ELIXIR ORAL
Status: CANCELLED | OUTPATIENT
Start: 2020-06-22 | End: 2020-06-23

## 2020-06-22 RX ORDER — LIDOCAINE HYDROCHLORIDE 20 MG/ML
INJECTION, SOLUTION EPIDURAL; INFILTRATION; INTRACAUDAL; PERINEURAL AS NEEDED
Status: DISCONTINUED | OUTPATIENT
Start: 2020-06-22 | End: 2020-06-22 | Stop reason: HOSPADM

## 2020-06-22 RX ORDER — ONDANSETRON 2 MG/ML
4 INJECTION INTRAMUSCULAR; INTRAVENOUS
Status: CANCELLED | OUTPATIENT
Start: 2020-06-22 | End: 2020-06-23

## 2020-06-22 RX ORDER — OXYCODONE HYDROCHLORIDE 5 MG/1
10 TABLET ORAL
Status: CANCELLED | OUTPATIENT
Start: 2020-06-22

## 2020-06-22 RX ORDER — DEXAMETHASONE SODIUM PHOSPHATE 4 MG/ML
INJECTION, SOLUTION INTRA-ARTICULAR; INTRALESIONAL; INTRAMUSCULAR; INTRAVENOUS; SOFT TISSUE AS NEEDED
Status: DISCONTINUED | OUTPATIENT
Start: 2020-06-22 | End: 2020-06-22 | Stop reason: HOSPADM

## 2020-06-22 RX ORDER — NEOSTIGMINE METHYLSULFATE 1 MG/ML
INJECTION, SOLUTION INTRAVENOUS AS NEEDED
Status: DISCONTINUED | OUTPATIENT
Start: 2020-06-22 | End: 2020-06-22 | Stop reason: HOSPADM

## 2020-06-22 RX ORDER — SODIUM CHLORIDE, SODIUM LACTATE, POTASSIUM CHLORIDE, CALCIUM CHLORIDE 600; 310; 30; 20 MG/100ML; MG/100ML; MG/100ML; MG/100ML
75 INJECTION, SOLUTION INTRAVENOUS CONTINUOUS
Status: DISCONTINUED | OUTPATIENT
Start: 2020-06-22 | End: 2020-06-23 | Stop reason: HOSPADM

## 2020-06-22 RX ADMIN — ROCURONIUM BROMIDE 10 MG: 10 INJECTION, SOLUTION INTRAVENOUS at 07:36

## 2020-06-22 RX ADMIN — ROCURONIUM BROMIDE 40 MG: 10 INJECTION, SOLUTION INTRAVENOUS at 07:45

## 2020-06-22 RX ADMIN — SODIUM CHLORIDE, SODIUM LACTATE, POTASSIUM CHLORIDE, AND CALCIUM CHLORIDE 125 ML/HR: 600; 310; 30; 20 INJECTION, SOLUTION INTRAVENOUS at 06:46

## 2020-06-22 RX ADMIN — SUCCINYLCHOLINE CHLORIDE 100 MG: 20 INJECTION, SOLUTION INTRAMUSCULAR; INTRAVENOUS; PARENTERAL at 07:36

## 2020-06-22 RX ADMIN — CEFAZOLIN SODIUM 2 G: 1 POWDER, FOR SOLUTION INTRAMUSCULAR; INTRAVENOUS at 07:47

## 2020-06-22 RX ADMIN — MIDAZOLAM HYDROCHLORIDE 2 MG: 2 INJECTION, SOLUTION INTRAMUSCULAR; INTRAVENOUS at 07:06

## 2020-06-22 RX ADMIN — ONDANSETRON HYDROCHLORIDE 4 MG: 2 SOLUTION INTRAMUSCULAR; INTRAVENOUS at 08:40

## 2020-06-22 RX ADMIN — Medication 100 MCG: at 08:05

## 2020-06-22 RX ADMIN — BUPIVACAINE 10 ML: 13.3 INJECTION, SUSPENSION, LIPOSOMAL INFILTRATION at 07:14

## 2020-06-22 RX ADMIN — ONDANSETRON HYDROCHLORIDE 4 MG: 2 SOLUTION INTRAMUSCULAR; INTRAVENOUS at 07:40

## 2020-06-22 RX ADMIN — PROPOFOL 150 MG: 10 INJECTION, EMULSION INTRAVENOUS at 07:36

## 2020-06-22 RX ADMIN — LIDOCAINE HYDROCHLORIDE 40 MG: 20 INJECTION, SOLUTION EPIDURAL; INFILTRATION; INTRACAUDAL; PERINEURAL at 07:36

## 2020-06-22 RX ADMIN — GLYCOPYRROLATE 0.8 MG: 0.2 INJECTION, SOLUTION INTRAMUSCULAR; INTRAVENOUS at 08:44

## 2020-06-22 RX ADMIN — DEXAMETHASONE SODIUM PHOSPHATE 4 MG: 4 INJECTION, SOLUTION INTRAMUSCULAR; INTRAVENOUS at 07:40

## 2020-06-22 RX ADMIN — FENTANYL CITRATE 50 MCG: 0.05 INJECTION, SOLUTION INTRAMUSCULAR; INTRAVENOUS at 07:36

## 2020-06-22 RX ADMIN — BUPIVACAINE HYDROCHLORIDE 20 ML: 2.5 INJECTION, SOLUTION EPIDURAL; INFILTRATION; INTRACAUDAL; PERINEURAL at 07:14

## 2020-06-22 RX ADMIN — Medication 10 MG: at 08:02

## 2020-06-22 RX ADMIN — Medication 1 MG: at 09:00

## 2020-06-22 RX ADMIN — SUGAMMADEX 100 MG: 100 INJECTION, SOLUTION INTRAVENOUS at 09:23

## 2020-06-22 RX ADMIN — FENTANYL CITRATE 50 MCG: 0.05 INJECTION, SOLUTION INTRAMUSCULAR; INTRAVENOUS at 07:06

## 2020-06-22 RX ADMIN — GLYCOPYRROLATE 0.1 MG: 0.2 INJECTION, SOLUTION INTRAMUSCULAR; INTRAVENOUS at 09:00

## 2020-06-22 RX ADMIN — Medication 4 MG: at 08:44

## 2020-06-22 RX ADMIN — FENTANYL CITRATE 50 MCG: 0.05 INJECTION, SOLUTION INTRAMUSCULAR; INTRAVENOUS at 07:09

## 2020-06-22 RX ADMIN — FENTANYL CITRATE 50 MCG: 0.05 INJECTION, SOLUTION INTRAMUSCULAR; INTRAVENOUS at 07:50

## 2020-06-22 NOTE — BRIEF OP NOTE
BRIEF OPERATIVE NOTE    Date of Procedure: 6/22/2020   Preoperative Diagnosis: Rotator cuff syndrome of left shoulder [M75.102]  Impingement syndrome of left shoulder [M75.42]  Osteoarthritis of glenohumeral joint, left [M19.012]  Postoperative Diagnosis: Rotator cuff syndrome of left shoulder [M75.102]  Impingement syndrome of left shoulder [M75.42]  Osteoarthritis of glenohumeral joint, left [M19.012]    Procedure: Procedure(s):  LEFT REVERSE SHOULDER ARTHROPLASTY, BICEPS TENODESIS, AXILALRY NERVE DISSECTION (GEN WITH EXPAREL REGIONAL NERVE BLOCK) (FAST TRACK)    Surgeon: Johan Salazar MD  Assistant(s): Lary aDwson PA-C, ATC  Anesthesia: General   Estimated Blood Loss: minimal  Specimens: * No specimens in log *   Findings: RCT  Complications: None  Implants:   Implant Name Type Inv.  Item Serial No.  Lot No. LRB No. Used Action   BASEPLT W/ADAPTER DONAVAN 25MM -- COMPREHENSIVE - SNA  BASEPLT W/ADAPTER DONAVAN 25MM -- COMPREHENSIVE NA [employer] [id number] Left 1 Implanted   GLENOSPHERE RVS STD 0D 36MM -- COMPREHENSIVE - SNA  GLENOSPHERE RVS STD 0D 36MM -- COMPREHENSIVE NA [employer] [id number] Left 1 Implanted   STEM HUM ARTURO MINI 12MM -- COMPREHENSIVE - SNA  STEM HUM ARTURO MINI 12MM -- COMPREHENSIVE NA [employer] [id number] Left 1 Implanted   VIVACIT-E HIGHLY CROSSLINKED POLYETHYLENE 365MM BEARING    NA [employer] [id number] Left 1 Implanted   TRAY HUM STD 40MM BUNNY -- COMPREHENSIVE - [id number]  TRAY HUM STD 40MM BUNNY -- COMPREHENSIVE [id number] [employer]  Left 1 Implanted   SCREW LOCKING FIXED 4.75 X 15MM 3.5 HEX - SNA Screw SCREW LOCKING FIXED 4.75 X 15MM 3.5 HEX NA [employer] [id number] Left 1 Implanted   SCREW LOCKING FIXED 4.75 X 15MM 3.5 HEX - SNA Screw SCREW LOCKING FIXED 4.75 X 15MM 3.5 HEX NA [employer] [id number] Left 1 Implanted   SCREW 3.5 HEX 4.75MM X 25MM FIXED - SNA Screw SCREW 3.5 HEX 4.75MM X 25MM FIXED NA [employer] [id number] Left 1 Implanted   SCREW 3.5 HEX 4.75MM X 20MM FIXED - SNA Screw SCREW 3.5 HEX 4.75MM X 20MM FIXED NA [employer] [id number] Left 1 Implanted   SCR CTRL RVS 6.5X35MM STRL/RST -- COMPREHENSIVE - SNA  SCR CTRL RVS 6.5X35MM STRL/RST -- COMPREHENSIVE NA [employer] [id number] Left 1 Implanted

## 2020-06-22 NOTE — PERIOP NOTES
Pt dressed and getting ready for discharge when pt informs RN the grand daughter is taking pt home but not able to stay with the pt today and will be going back to [place]. A neightbor will be checking in on pt, but pt was hoping to stay inpatient 1-2 nights because she has no one at home. RN called into OR 2 to communicate this to Dr. Mendel Caulk. Dr. Mendel Caulk will come speak to pt after completing the surgery he is currently in.

## 2020-06-22 NOTE — ANESTHESIA PROCEDURE NOTES
Peripheral Block    Start time: 6/22/2020 7:06 AM  End time: 6/22/2020 7:14 AM  Performed by: Myron Serrano MD  Authorized by: Myron Serrano MD       Pre-procedure: Indications: at surgeon's request and post-op pain management    Preanesthetic Checklist: patient identified, risks and benefits discussed, site marked, timeout performed, anesthesia consent given and patient being monitored    Timeout Time: 07:06          Block Type:   Block Type:   Interscalene  Laterality:  Left  Monitoring:  Continuous pulse ox, frequent vital sign checks, heart rate, responsive to questions and oxygen  Injection Technique:  Single shot  Procedures: ultrasound guided and nerve stimulator    Patient Position: supine  Prep: chlorhexidine    Location:  Interscalene  Needle Type:  Stimuplex  Needle Gauge:  22 G  Needle Localization:  Nerve stimulator and ultrasound guidance    Assessment:  Number of attempts:  1  Injection Assessment:  Incremental injection every 5 mL, local visualized surrounding nerve on ultrasound, negative aspiration for blood, no paresthesia and no intravascular symptoms  Patient tolerance:  Patient tolerated the procedure well with no immediate complications

## 2020-06-22 NOTE — OP NOTES
REVERSE TOTAL SHOULDER ARTHROPLASTY      Date of Procedure: 6/22/2020   Preoperative Diagnosis: Rotator cuff syndrome of left shoulder [M75.102]  Impingement syndrome of left shoulder [M75.42]  Osteoarthritis of glenohumeral joint, left [M19.012]  Postoperative Diagnosis: Rotator cuff syndrome of left shoulder [M75.102]  Impingement syndrome of left shoulder [M75.42]  Osteoarthritis of glenohumeral joint, left [M19.012] , BICEPS TENDONITIS  Procedure: Procedure(s):  LEFT REVERSE SHOULDER ARTHROPLASTY, BICEPS TENODESIS, AXILALRY NERVE DISSECTION (GEN WITH EXPAREL REGIONAL NERVE BLOCK) (FAST TRACK) , BICEPS TENODESIS, AXILLARY NERVE DISSECTION  Surgeon: Paty Salgado MD  Assistant(s): Zander Byers PA-C, ATC  Anesthesia: General   Estimated Blood Loss: 100cc  Specimens: * No specimens in log *   Complications: None  Implants:  Implant Name Type Inv.  Item Serial No.  Lot No. LRB No. Used Action   BASEPLT W/ADAPTER DONAVAN 25MM -- COMPREHENSIVE - SNA  BASEPLT W/ADAPTER DONAVAN 25MM -- COMPREHENSIVE NA [employer] [id number] Left 1 Implanted   GLENOSPHERE RVS STD 0D 36MM -- COMPREHENSIVE - SNA  GLENOSPHERE RVS STD 0D 36MM -- COMPREHENSIVE NA [employer] [id number] Left 1 Implanted   STEM HUM ARTURO MINI 12MM -- COMPREHENSIVE - SNA  STEM HUM ARTURO MINI 12MM -- COMPREHENSIVE NA [employer] [id number] Left 1 Implanted   VIVACIT-E HIGHLY CROSSLINKED POLYETHYLENE 365MM BEARING    NA [employer] [id number] Left 1 Implanted   TRAY HUM STD 40MM BUNNY -- COMPREHENSIVE - [id number]  TRAY HUM STD 40MM BUNNY -- COMPREHENSIVE [id number] [employer]  Left 1 Implanted   SCREW LOCKING FIXED 4.75 X 15MM 3.5 HEX - SNA Screw SCREW LOCKING FIXED 4.75 X 15MM 3.5 HEX [employer] [id number] Left 1 Implanted   SCREW LOCKING FIXED 4.75 X 15MM 3.5 HEX - SNA Screw SCREW LOCKING FIXED 4.75 X 15MM 3.5 HEX [employer] [id number] Left 1 Implanted   SCREW 3.5 HEX 4.75MM X 25MM FIXED - SNA Screw SCREW 3.5 HEX 4.75MM X 25MM FIXED [employer] [id number] Left 1 Implanted   SCREW 3.5 HEX 4.75MM X 20MM FIXED - SNA Screw SCREW 3.5 HEX 4.75MM X 20MM FIXED [employer] [id number] Left 1 Implanted   SCR CTRL RVS 6.5X35MM STRL/RST -- COMPREHENSIVE - SNA  SCR CTRL RVS 6.5X35MM STRL/RST -- COMPREHENSIVE NA [employer] [id number] Left 1 Implanted         INDICATIONS:  The patient is a patient of mine who has had a long history of pain and discomfort in the left shoulder. After failure of conservative treatment the patient has elected to proceed with surgical intervention for shoulder pain refractory to conservative treatment. DESCRIPTION OF PROCEDURE:  After being informed of the risks and benefits, which include, but are not limited to bleeding, infection, neurovascular damage, wound complications, periprosthetic loosening, fracture/dislocation, and early failure of the prosthesis, the patient consented for the procedure. After adequate general anesthesia all bony prominences were padded well, and the involved shoulder was prepped and draped in a sterile fashion. A standard deltopectoral incision was then made and the cephalic vein was identified and preserved. The vein was retracted medially. The subdeltoid and subcoracoid spaces were then dissected and retracted. The bicipital sheath was incised, and the biceps tendon was then noted to be significantly frayed, and it was tenodesed to the pectoralis major tendon with #2 FiberWire. The bicipital sheath was incised proximally, and the remnant of the subscapularis and capsule was taken down off the lesser tuberosity. The inferior medial capsule was then incised and the shoulder was externally rotated and dislocated. The anatomic neck of the shoulder was then marked and cut with the oscillating saw. We serially reamed and broached the humerus to the appropriate size. At that phase the glenoid was addressed.  A lamina  was then inserted, and the axillary nerve was dissected as it coursed off the posterior cord of the brachial plexus. The nerve was dissected and preserved as it passed inferiorly to the capsule. The capsule was then incised and released. The labrum was then circumferentially removed as was the remnant of the biceps tendon. We subsequently released the anterior capsule and then inserted the index finger along the glenoid neck. Tthe guide pin for the reamer was inserted into the glenoid with a 15 degree caudal tilt. Care was taken to be sure that the guide pin exited at least 20-mm medial to the anterior glenoid edge. The glenoid was then reamed with the base-plate reamer. The excess bone on the periphery was removed with the rongeur and a bur, and the base-plate was then inserted. The central screw was then drilled, measured and inserted. The peripheral locking screws were then inserted as well. After securing the base-plate we sized the glenosphere to establish appropriate tensioning and offset of the shoulder. The final glenosphere was then impacted into the [place] taper with the offset inferiorly to prevent scapular notching. We then trialed the humeral socket. The appropriate height of the humeral socket was then established to again provide appropriate tension of the deltoid. The shoulder was taken through a range of motion and no instability or scapular impingement was noted. The trial humeral components were removed, and the final stem was impacted into position. We did pass a #5 FiberWire suture around the stem and out the medial aspect of the humerus to aid in the repair of the capsule and subscapularis. At that point the humeral base plate and cup were then impacted on the humerus. The shoulder was again reduced and the wound was irrigated copiously with antibiotic irrigation. The subscapularis and capsule were repaired to the lesser tuberosity using modified Tomer-Vini sutures.  The wound was again irrigated copiously with antibiotic irrigation, and the deltopectoral interval was then closed taking care to preserve the cephalic vein. The skin was closed in layers. Sterile dressings were applied, and the patient was awakened and taken to the recovery room in stable condition.

## 2020-06-22 NOTE — ANESTHESIA POSTPROCEDURE EVALUATION
Procedure(s):  LEFT REVERSE SHOULDER ARTHROPLASTY, BICEPS TENODESIS, AXILALRY NERVE DISSECTION (GEN WITH EXPAREL REGIONAL NERVE BLOCK) (FAST TRACK). general, regional    Anesthesia Post Evaluation      Multimodal analgesia: multimodal analgesia not used between 6 hours prior to anesthesia start to PACU discharge  Patient location during evaluation: PACU  Patient participation: complete - patient participated  Level of consciousness: awake  Pain management: adequate  Airway patency: patent  Anesthetic complications: no  Cardiovascular status: acceptable, blood pressure returned to baseline and hemodynamically stable  Respiratory status: acceptable  Hydration status: acceptable  Post anesthesia nausea and vomiting:  controlled      INITIAL Post-op Vital signs:   Vitals Value Taken Time   /76 6/22/2020 10:30 AM   Temp 36.2 °C (97.1 °F) 6/22/2020 10:18 AM   Pulse 91 6/22/2020 10:32 AM   Resp 20 6/22/2020 10:32 AM   SpO2 99 % 6/22/2020 10:32 AM   Vitals shown include unvalidated device data.

## 2020-06-22 NOTE — DISCHARGE INSTRUCTIONS
Shoulder Surgery Discharge Instructions  Dr. Homero Alva    Please take the time to review the following instructions before you leave the hospital and use them as guidelines during your recovery from surgery. If you have any questions, you may contact my office at [phone]. Wound Care / Dressing Change    Two days after surgery you should remove the big, bulky white dressing. Do NOT remove the clear, plastic dressing against your skin. Keep the clear, plastic dressing intact until your follow up in the office. Showering / Bathing    If the clear plastic dressing is intact and there is no drainage, you may shower. If the dressing is loose or water gets under it please notify our office. If there is drainage, please call the office immediately. Sling    Keep your arm in the immobilizer/sling at all times except when showering, changing your clothes, and doing the exercises shown to you by Dr. Gus Guillen or your physical/occupational therapist prior to your discharge from the hospital.  Keep your arm at your side when changing your clothes and showering. Do not move it away from your body. Ice and Elevation    Ice therapy should be used consistently for 48 hours after surgery. Subsequently, you should ice 3 times per day for 20 minutes at a time. After the first week, you may use ice as needed for pain and swelling. Please ensure there is protective barrier between your skin and the ice. Diet    You may advance your regular diet as tolerated. Increase your clear liquid intake for the next 2-3 days. Medications  Your prescriptions were sent to the pharmacy on file. We are unable to change the  narcotic prescription that has already been sent today. If you would like to change your pharmacy for FUTURE prescriptions, please call the office. Pharmacy on File: [facility]      1.  Pain: You were prescribed a narcotic medication for pain control. Please  use the medication as prescribed. Pain medications may cause constipation - Colace or Milk of Magnesia may be used as needed. Other possible side effects of pain medications are dizziness, headache, nausea, vomiting, and urinary retention. Discontinue the pain medication if you develop itching, rash, shortness of breath, or difficulties swallowing. If these symptoms become severe or arent relieved by discontinuing the medication, you should seek immediate medical attention. You cannot drive or operate machinery while taking narcotic medication. Some pain medications already contain Tylenol/Acetaminophen. Please read your prescription pain medicine bottle prior to taking additional Tylenol. Do not exceed 3000mg of Tylenol/Acetaminophen in a 24 hour period. Refills of pain medication are authorized during office hours only   ( Monday to Friday 8am-5pm)        2. Blood Thinner:  You have been given a prescription for Aspirin 325mg. Please take one tablet twice daily for 14 days. Do not take anti-inflammatory medication (Advil, Aleve, Motrin) while taking the blood thinner. 3. Stool Softeners:  Pain medications can cause constipation. Stool softeners, warm prune juice and increasing your water and fiber intake can help prevent constipation. Do not take laxatives. 4. You should resume other medications you were taking prior to your surgery. Pain medication may change the effects of any antidepressant medication you are taking. If you have any questions about possible interactions between your regular medications and the pain medication you should consult the physician who prescribes your regular medications. Physical Therapy:  You must begin outpatient physical therapy 2-3 days after your surgery. Your were given a prescription when you scheduled your surgery.    If you do not have an appointment scheduled or a therapy prescription please call Dr. Alcide Peabody' office immediately. APPOINTMENT: 6-25 at 9:30am    Follow-Up Appointment:  Please follow up at your scheduled appointment 10-14 days from the day of your surgery. If you do not already have an appointment, please call our office at [phone] for your follow up appointment. Your appointment will likely be with Althia Dose, PA-C. Sheryle Dus assists Dr. Alcide Peabody in surgery and will be able to review your operation and answer your questions. APPOINTMENT: 7-7 at 10:30am      Important Signs and Symptoms:  If any of the following signs and symptoms occurs, you should contact Dr. Mamadou Smith office. Please be advised if a problem arises which you feel requires immediate medical attention or you are unable to contact Dr. Mamadou Smith office, you should seek immediate medical attention. Signs and symptoms to watch for include:    1. A sudden increase in swelling and/or redness or warmth at the area of your surgery which isnt relieved by rest, ice, and elevation. 2. Oral temperature greater than 101degrees for 12 hours or more which isnt relieved by an increase in fluid intake and taking two Tylenol every 4-6 hours. 3. Excessive drainage from your incisions, or drainage which hasnt stopped by 72 hours after your surgery. 4. Calf pain, ever, chills, shortness of breath, chest pain, nausea, vomiting or other signs and symptoms which are of concern to you. Unless you are having a true medical emergency,   you MUST call Dr. Alcide Peabody' office   BEFORE proceeding to the Emergency Department. A provider is on call 24 hours/day. Exercises after Shoulder Surgery  1.  Passive Forward Flexion:                             Instructions:  - Lay on your back  - Take your non surgical arm and grab the wrist of your surgical arm   - Use your non surgical arm to lift your surgical arm over your head with the elbow straight   - Slowly return your arm to your side using your non surgical arm  - Repeat 20 times in the morning and 20 times in the evening  Remember:  - Passive motion: Your arm is lifted with the help of your other hand. o The repair is protected when you do passive motion  - Active motion: You lift your arm by using your shoulder muscles. o DO NOT DO ANY ACTIVE MOTION FOR NOW    2. Codman Pendulum Exercises                                         Instructions:  - Bend forward about 90° at the waist and support yourself on a sturdy object with your non surgical arm  (bend slightly at the knees to protect your back)  - Gently allow your surgical arm to hang towards the ground  - Move your hips forward and backward allowing your arm to swing slightly  - Arm can move forward, backward, and in small circles (clockwise and counterclockwise)  o Remember: let your body move your arm, do not use your arm muscles  - Begin performing the exercise for about 30 seconds. Progress to 2 minutes. - Repeat 2-3 times per day      DISCHARGE SUMMARY from your Nurse      PATIENT INSTRUCTIONS    After general anesthesia or intravenous sedation, for 24 hours or while taking prescription Narcotics:  · Limit your activities  · Do not drive and operate hazardous machinery  · Do not make important personal or business decisions  · Do  not drink alcoholic beverages  · If you have not urinated within 8 hours after discharge, please contact your surgeon on call.     Report the following to your surgeon:  · Excessive pain, swelling, redness or odor of or around the surgical area  · Temperature over 100.5  · Nausea and vomiting lasting longer than 4 hours or if unable to take medications  · Any signs of decreased circulation or nerve impairment to extremity: change in color, persistent  numbness, tingling, coldness or increase pain  · Any questions      GOOD HELP TO FIGHT AN INFECTION  Here are a few tip to help reduce the chance of getting an infection after surgery:   Wash Your Hands   Good handwashing is the most important thing you and your caregiver can do.  Wash before and after caring for any wounds. Dry your hand with a clean towel.  Wash with soap and water for at least 20 seconds. A TIP: sing the \"Happy Birthday\" song through one time while washing to help with the timing.  Use a hand  in between washings.  Shower   When your surgeon says it is OK to take a shower, use a new bar of antibacterial soap (if that is what you use, and keep that bar of soap ONLY for your use), or antibacterial body wash.  Use a clean wash cloth or sponge when you bathe.  Dry off with a clean towel  after every bath - be careful around any wounds, skin staples, sutures or surgical glue over/on wounds.  Do not enter swimming pools, hot tubs, lakes, rivers and/or ocean until wounds are healed and your doctor/surgeon says it is OK.  Use Clean Sheets   Sleep on freshly laundered sheets after your surgery.  Keep the surgery site covered with a clean, dry bandage (if instructed to do so). If the bandage becomes soiled, reapply a new, dry, clean bandage.  Do not allow pets to sleep with you while your wound is healing.  Lifestyle Modification and Controlling Your Blood Sugar   Smoking slows wound healing. Stop smoking and limit exposure to second-hand smoke.  High blood sugar slows wound healing. Eat a well-balanced diet to provide proper nutrition while healing   Monitor your blood sugar (if you are a diabetic) and take your medications as you are suppose to so you can control you blood sugar after surgery. COUGH AND DEEP BREATHE    Breathing deeply and coughing are very important exercises to do after surgery. Deep breathing and coughing open the little air tubes and air sacks in your lungs. You take deep breaths every day. You may not even notice - it is just something you do when you sigh or yawn. It is a natural exercise you do to keep these air passages open.       After surgery, take deep breaths and cough, on purpose. DIRECTIONS:  · Take 10 to 15 slow deep breaths every hour while awake. · Breathe in deeply, and hold it for 2 seconds. · Exhale slowly through puckered lips, like blowing up a balloon. · After every 4th or 5th deep breath, hug your pillow to your chest or belly and give a hard, deep cough. Yes, it will probably hurt. But doing this exercise is a very important part of healing after surgery. Take your pain medicine to help you do this exercise without too much pain. Coughing and deep breathing help prevent bronchitis and pneumonia after surgery. If you had chest or belly surgery, use a pillow as a \"hug antonio\" and hold it tightly to your chest or belly when you cough. ANKLE PUMPS    Ankle pumps increase the circulation of oxygenated blood to your lower extremities and decrease your risk for circulation problems such as blood clots. They also stretch the muscles, tendons and ligaments in your foot and ankle, and prevent joint contracture in the ankle and foot, especially after surgeries on the legs. It is important to do ankle pump exercises regularly after surgery because immobility increases your risk for developing a blood clot. Your doctor may also have you take an Aspirin for the next few days as well. If your doctor did not ask you to take an Aspirin, consult with him before starting Aspirin therapy on your own. The exercise is quite simple. · Slowly point your foot forward, feeling the muscles on the top of your lower leg stretch, and hold this position for 5 seconds. · Next, pull your foot back toward you as far as possible, stretching the calf muscles, and hold that position for 5 seconds. · Repeat with the other foot. · Perform 10 repetitions every hour while awake for both ankles if possible (down and then up with the foot once is one repetition).     You should feel gentle stretching of the muscles in your lower leg when doing this exercise. If you feel pain, or your range of motion is limited, don't push too hard. Only go the limit your joint and muscles will let you go. If you have increasing pain, progressively worsening leg warmth or swelling, STOP the exercise and call your doctor. MEDICATION AND   SIDE EFFECT GUIDE    The [facility] MEDICATION AND SIDE EFFECT GUIDE was provided to the PATIENT AND CARE PROVIDER. Information provided includes instruction about drug purpose and common side effects for the following medications:   · Medications sent to your pharmacy by Dr. Brenda Zhang        These are general instructions for a healthy lifestyle:    *   Please give a list of your current medications to your Primary Care Provider. *   Please update this list whenever your medications are discontinued, doses are changed, or new medications (including over-the-counter products) are added. *   Please carry medication information at all times in case of emergency situations. About Smoking  No smoking / No tobacco products  Avoid exposure to second hand smoke     Surgeon General's Warning:  Quitting smoking now greatly reduces serious risk to your health. Obesity, smoking, and sedentary lifestyle greatly increases your risk for illness and disease. A healthy diet, regular physical exercise & weight monitoring are important for maintaining a healthy lifestyle. Congestive Heart Failure  You may be retaining fluid if you have a history of heart failure or if you experience any of the following symptoms:  Weight gain of 3 pounds or more overnight or 5 pounds in a week, increased swelling in your hands or feet or shortness of breath while lying flat in bed. Please call your doctor as soon as you notice any of these symptoms; do not wait until your next office visit.       Recognize signs and symptoms of STROKE:  F -  Face looks uneven  A -  Arms unable to move or move evenly  S - Speech slurred or non-existent  T -  Time-call 911 as soon as signs and symptoms begin-DO NOT go          back to bed or wait to see if you get better-TIME IS BRAIN. Warning Signs of HEART ATTACK   Call 911 if you have these symptoms:     Chest discomfort. Most heart attacks involve discomfort in the center of the chest that lasts more than a few minutes, or that goes away and comes back. It can feel like uncomfortable pressure, squeezing, fullness, or pain.  Discomfort in other areas of the upper body. Symptoms can include pain or discomfort in one or both arms, the back, neck, jaw, or stomach.  Shortness of breath with or without chest discomfort.  Other signs may include breaking out in a cold sweat, nausea, or lightheadedness. Don't wait more than five minutes to call 911 - MINUTES MATTER! Fast action can save your life. Calling 911 is almost always the fastest way to get lifesaving treatment. Emergency Medical Services staff can begin treatment when they arrive -- up to an hour sooner than if someone gets to the hospital by car. Learning About Coronavirus [phone])  Coronavirus [phone]): Overview  What is coronavirus (COVID-19)? The coronavirus disease (COVID-19) is caused by a virus. It is an illness that was first found in [place], [place], in December 2019. It has since spread worldwide. The virus can cause fever, cough, and trouble breathing. In severe cases, it can cause pneumonia and make it hard to breathe without help. It can cause death. Coronaviruses are a large group of viruses. They cause the common cold. They also cause more serious illnesses like Middle East respiratory syndrome (MERS) and severe acute respiratory syndrome (SARS). COVID-19 is caused by a novel coronavirus. That means it's a new type that has not been seen in people before. This virus spreads person-to-person through droplets from coughing and sneezing.  It can also spread when you are close to someone who is infected. And it can spread when you touch something that has the virus on it, such as a doorknob or a tabletop. What can you do to protect yourself from coronavirus (COVID-19)? The best way to protect yourself from getting sick is to:  · Avoid areas where there is an outbreak. · Avoid contact with people who may be infected. · Wash your hands often with soap or alcohol-based hand sanitizers. · Avoid crowds and try to stay at least 6 feet away from other people. · Wash your hands often, especially after you cough or sneeze. Use soap and water, and scrub for at least 20 seconds. If soap and water aren't available, use an alcohol-based hand . · Avoid touching your mouth, nose, and eyes. What can you do to avoid spreading the virus to others? To help avoid spreading the virus to others:  · Cover your mouth with a tissue when you cough or sneeze. Then throw the tissue in the trash. · Use a disinfectant to clean things that you touch often. · Stay home if you are sick or have been exposed to the virus. Don't go to school, work, or public areas. And don't use public transportation. · If you are sick:  ? Leave your home only if you need to get medical care. But call the doctor's office first so they know you're coming. And wear a face mask, if you have one.  ? If you have a face mask, wear it whenever you're around other people. It can help stop the spread of the virus when you cough or sneeze. ? Clean and disinfect your home every day. Use household  and disinfectant wipes or sprays. Take special care to clean things that you grab with your hands. These include doorknobs, remote controls, phones, and handles on your refrigerator and microwave. And don't forget countertops, tabletops, bathrooms, and computer keyboards. When to call for help  Call 911 anytime you think you may need emergency care. For example, call if:  · You have severe trouble breathing.  (You can't talk at all.)  · You have constant chest pain or pressure. · You are severely dizzy or lightheaded. · You are confused or can't think clearly. · Your face and lips have a blue color. · You pass out (lose consciousness) or are very hard to wake up. Call your doctor now if you develop symptoms such as:  · Shortness of breath. · Fever. · Cough. If you need to get care, call ahead to the doctor's office for instructions before you go. Make sure you wear a face mask, if you have one, to prevent exposing other people to the virus. Where can you get the latest information? The following health organizations are tracking and studying this virus. Their websites contain the most up-to-date information. Josie Duggan also learn what to do if you think you may have been exposed to the virus. · U.[place]. Centers for Disease Control and Prevention (CDC): The CDC provides updated news about the disease and travel advice. The website also tells you how to prevent the spread of infection. www.cdc.gov  · World Health Organization [facility]): WHO offers information about the virus outbreaks. WHO also has travel advice. www.who.int  Current as of: April 1, 2020               Content Version: 12.4  © 3600-1139 [employer]. Care instructions adapted under license by your healthcare professional. If you have questions about a medical condition or this instruction, always ask your healthcare professional. [address] any warranty or liability for your use of this information. The discharge information has been reviewed with the {PATIENT PARENT GUARDIAN:47555}. Any questions and concerns from the {PATIENT PARENT GUARDIAN:16251} have been addressed. The {PATIENT PARENT GUARDIAN:78270} verbalized understanding.         Other information in your discharge envelope:  [x]     PRESCRIPTIONS  [x]     Medical device information sheets/pamphlets from their -[employer]      The following personal items collected during your admission are returned to you:   Dental Appliance: Dental Appliances: None  Vision: Visual Aid: None  Hearing Aid:    Jewelry: Jewelry: None  Clothing: Clothing: Other (comment)(clothes to bag and in locker)  Other Valuables:  Other Valuables: Cell Phone  Valuables sent to safe:

## 2020-06-22 NOTE — INTERVAL H&P NOTE
Date of Surgery Update:  Reese Carpio was seen and examined. History and physical has been reviewed. The patient has been examined. There have been no significant clinical changes since the completion of the originally dated History and Physical.    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 Previous non-operative treatments have been tried including rest, ice, activity modifications, pain medications, and injectable treatments. The pain and impairment have progressively worsened now limiting ADLS and having a negative impact on quality of life. The risks, benefits and potential complications of the procedure have been discussed with the patient and all questions have been answered satisfactorily. The patient was counseled at length about the risks of lilia Covid-19 during their perioperative period and any recovery window from their procedure. The patient was made aware that lilia Covid-19  may worsen their prognosis for recovering from their procedure and lend to a higher morbidity and/or mortality risk. All material risks, benefits, and reasonable alternatives including postponing the procedure were discussed. The patient does  wish to proceed with the procedure at this time.           Signed By: Alonzo Ramirez MD     June 22, 2020 7:26 AM

## 2020-06-22 NOTE — ANESTHESIA PREPROCEDURE EVALUATION
Relevant Problems   No relevant active problems       Anesthetic History   No history of anesthetic complications            Review of Systems / Medical History  Patient summary reviewed, nursing notes reviewed and pertinent labs reviewed    Pulmonary  Within defined limits                 Neuro/Psych   Within defined limits           Cardiovascular    Hypertension              Exercise tolerance: >4 METS     GI/Hepatic/Renal  Within defined limits              Endo/Other        Cancer     Other Findings   Comments:  Hx breast ca 2014  Hx SBO  Partial adrenalectomy  Paget's disease           Physical Exam    Airway  Mallampati: II    Neck ROM: normal range of motion   Mouth opening: Normal     Cardiovascular  Regular rate and rhythm,  S1 and S2 normal,  no murmur, click, rub, or gallop  Rhythm: regular  Rate: normal         Dental  No notable dental hx       Pulmonary  Breath sounds clear to auscultation               Abdominal  GI exam deferred       Other Findings            Anesthetic Plan    ASA: 3  Anesthesia type: general and regional - interscalene block          Induction: Intravenous  Anesthetic plan and risks discussed with: Patient

## 2020-06-22 NOTE — PERIOP NOTES
Nancy RYAN with Dr. Mary Raymond at pt's bedside speaking with pt and will go speak to [facility] her grand daughter.

## 2020-06-23 NOTE — PERIOP NOTES
06/23/20, 11:37 AM    [facility]-Anesthesia Services Chart Audit - Patient Encounter Reviewed

## 2020-06-24 NOTE — DISCHARGE SUMMARY
Reverse Total Shoulder Arthoplasty Discharge Summary    Patient ID:  Kareem Ocampo  [id number]  52 y.o.  [id number]    Admit date: 6/22/2020    Discharge date and time: 6/22/2020 12:46 PM     Admitting Physician: Verdie Councilman, MD     Admission Diagnoses: Rotator cuff syndrome of left shoulder [M75.102]  Impingement syndrome of left shoulder [M75.42]  Osteoarthritis of glenohumeral joint, left [M19.012]  Shoulder arthritis [M19.019]    Discharge Diagnoses: Active Problems:    Shoulder arthritis (6/22/2020)        HISTORY OF PRESENT ILLNESS:  Kareem Ocampo is a pleasant 78 y.o. 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 The patient failed all conservative management. Therefore, Dr. Yanet De Leon and the patient decided the most appropriate plan of care is to proceed with a reverse total shoulder arthroplasty, to be preformed at [facility]. All preoperative clearance was done prior to surgery. The patient's complete history and physical, laboratory data and physical exam is well documented in hospital chart. HOSPITAL COURSE:  Kareem Ocampo was admitted on6/22/2020 and underwent successful reverse total shoulder arthroplasty. There were no complications intraoperatively and the patient was admitted. Physical therapy and occupational therapy initiated their evaluation and treatment. For pain control, an interscalene nerve block was used. The patient then was transitioned over oral narcotics in which was tolerated well. The incision site remained clean, dry, and intact. The patient remained neurovascularly intact. At the time of discharge, the patient was able to ambulate safely, go up and down stairs and had an understanding of the explicit discharge precautions and instructions following surgery. Post Op complications: none    Cannot display discharge medications since this patient is not currently admitted.       Discharged to: Home          Discharge right foot 2 nd digit OM instructions:  -Resume pre hospital diet.            -Resume home medications per medication reconciliation form. -Prescriptions for pain medication were provided to the patient.     -Patient to continue shoulder exercises as taught by the physical therapist.  -First follow-up appointment to be scheduled in 10-14 days. If patient is unaware of their appointment time/date, they are call Dr. Marguerite Ramos office at [phone].  -Explicit discharge instructions were provided to the patient.     Zacarias Vasquez MD

## 2020-09-28 ENCOUNTER — TELEPHONE (OUTPATIENT)
Dept: ENDOCRINOLOGY | Age: 70
End: 2020-09-28

## 2020-09-28 NOTE — TELEPHONE ENCOUNTER
----- Message from Joya David sent at 9/28/2020  9:41 AM EDT -----  Regarding: Dr Inocente Sparrow  Pt is calling to make her F/U appt , pt is former pt of Dr Nita Blanca, please call pt at [phone] or [phone]

## 2020-09-30 ENCOUNTER — TELEPHONE (OUTPATIENT)
Dept: ENDOCRINOLOGY | Age: 70
End: 2020-09-30

## 2020-10-14 ENCOUNTER — TELEPHONE (OUTPATIENT)
Dept: ENDOCRINOLOGY | Age: 70
End: 2020-10-14

## 2020-10-14 NOTE — TELEPHONE ENCOUNTER
----- Message from Jennie Avila sent at 10/14/2020  9:27 AM EDT -----  Regarding: Dr Moon/telephone  Pt is still waiting on a call from nurse regarding a appt , pt is a former pt of Dr Tory Cohen, spoke to someone last week , and she is still waiting on a call back, pt has been trying for 3 weeks to get a appt, please call pt at [phone]

## 2020-10-14 NOTE — TELEPHONE ENCOUNTER
Dr. Nicole Dalton  Former pt of  [facility], very nice lady. I  called her to get her schedule with Dr. Antonia Rader, but she said she doesn't have any  devices to do virtual visit. Can you see her for in person visit. Please let me know.

## 2020-10-19 NOTE — TELEPHONE ENCOUNTER
Jerry,    Please her know that I reviewed her chart and she does not need an in-person visit. However, I do need to speak to her on the phone about her care. Can she talk on Thursday 10/22 at 12:10pm?  The patient who is currently scheduled there cannot keep that appointment so I need to cancel her out so this slot will be free. Thanks.

## 2020-10-22 ENCOUNTER — VIRTUAL VISIT (OUTPATIENT)
Dept: ENDOCRINOLOGY | Age: 70
End: 2020-10-22
Payer: COMMERCIAL

## 2020-10-22 DIAGNOSIS — M88.88 PAGET'S DISEASE OF BONY PELVIS: Primary | ICD-10-CM

## 2020-10-22 DIAGNOSIS — E55.9 VITAMIN D DEFICIENCY: ICD-10-CM

## 2020-10-22 PROCEDURE — 99442 PR PHYS/QHP TELEPHONE EVALUATION 11-20 MIN: CPT | Performed by: INTERNAL MEDICINE

## 2020-10-22 NOTE — PROGRESS NOTES
Chief Complaint   Patient presents with    Follow-up     padgets disease    Other     phone call [phone]       **THIS IS A VIRTUAL VISIT VIA A TELEPHONE ENCOUNTER. PATIENT AGREED TO HAVE THEIR CARE DELIVERED OVER THE PHONE IN PLACE OF THEIR REGULARLY SCHEDULED OFFICE VISIT**    History of Present Illness: Vitor Baer is a 69 y.o. female here for follow up of Paget's disease. She was initially seen by Dr. Gadiel Gtz in June 2018 for this condition and plan was to treat with Reclast once she had her teeth removed but as of her last visit in 11/18, she still hadn't had this done. Dr. Gadiel Gtz subsequently left our practice in Jan 2020 and she is here today to reestablish with our clinic. She has now had all 17 teeth removed and does not require any further invasive dental work. She had left shoulder replacement in June 2020 and has recovered from this. He has not had any falls or fractures. She was previously taking 5000 of D3 daily when she last saw Dr. Gadiel Gtz but was changed to weekly vitamin D 50K units sometime this winter and took this until running out a few months ago. She does have more aches and pains since running out of this. No current outpatient medications on file. No current facility-administered medications for this visit.       Allergies   Allergen Reactions    Latex Contact Dermatitis     Review of Systems: PER HPI    Physical Examination: NOT PERFORMED DUE TO THIS BEING A TELEPHONE CALL      Data Reviewed:   Component      Latest Ref Rng & Units 6/9/2020 1/20/2020          10:24 AM  8:58 AM   Sodium      136 - 145 mmol/L 141 140   Potassium      3.5 - 5.1 mmol/L 4.5 4.8   Chloride      97 - 108 mmol/L 109 (H) 109 (H)   CO2      21 - 32 mmol/L 27 25   Anion gap      5 - 15 mmol/L 5 6   Glucose      65 - 100 mg/dL 87 89   BUN      6 - 20 MG/DL 13 22 (H)   Creatinine      0.55 - 1.02 MG/DL 0.76 0.72   BUN/Creatinine ratio      12 - 20   17 31 (H)   GFR est AA      >60 ml/min/1.73m2 >60 >60 GFR est non-AA      >60 ml/min/1.73m2 >60 >60   Calcium      8.5 - 10.1 MG/DL 9.4 9.6   Bilirubin, total      0.2 - 1.0 MG/DL 0.7 0.9   ALT      12 - 78 U/L 24 30   AST      15 - 37 U/L 19 27   Alk. phosphatase      45 - 117 U/L 224 (H) 203 (H)   Protein, total      6.4 - 8.2 g/dL 7.3 7.8   Albumin      3.5 - 5.0 g/dL 3.8 3.8   Globulin      2.0 - 4.0 g/dL 3.5 4.0   A-G Ratio      1.1 - 2.2   1.1 1.0 (L)       Assessment/Plan:   1. Paget's disease of bony pelvis: She had a bone scan in 11/16 that showed: \"Heterogeneously increased uptake involves left iliac bone including iliac crest, superior and inferior pubic rami and left acetabulum, correlating with the heterogeneous sclerotic changes seen on multiple prior CTs glands, including earliest available CT from May 18, 2014\". Her alk phos was elevated at 213 in 2/18 and still high at 203 in 1/20 and 224 in 6/20. She would benefit from treatment with Reclast which usually normalizes alk phos levels for 5-10 years but need to ensure her vitamin D stores are repleted to avoid hypocalcemia with this treatment. She has had her invasive dental work and all teeth have been removed and will not be having any implants. - will give one time dose of Reclast 5 mg IV once her vitamin D is confirmed to be normal  - check cmp now     2. Vitamin D deficiency: level was 22.9 in 2/18. She was previously taking 5000 of D3 daily when she last saw Dr. Farhana Aldana in 11/18 but was changed to weekly vitamin D 50K units sometime in the winter of 2020 and took this until running out a few months ago. - check Vitamin D 25-OH level now       Follow-up and Dispositions    · Return to be determined based on lab results and timing of Reclast infusion. We spent 15 minutes of total time together during this virtual visit with a telephone encounter. All questions were answered.     Copy sent to:  Travis Copeland NP    Lab follow up: 10/24/20    Sent her the following message in a letter and had [facility] call her with her lab results:    Resulted Orders   VITAMIN D, 25 HYDROXY (Collected: 10/23/2020  8:36 AM)   Result Value Ref Range    VITAMIN D, 25-HYDROXY 25.3 (L) 30.0 - 100.0 ng/mL      Comment:      Vitamin D deficiency has been defined by the [address] practice guideline as a  level of serum 25-OH vitamin D less than 20 ng/mL (1,2). The Endocrine Society went on to further define vitamin D  insufficiency as a level between 21 and 29 ng/mL (2). 1. IOM ([place] of Medicine). 2010. Dietary reference     intakes for calcium and D. [facility]: The     [employer]. 2. Kristian MF, Elsy NC, Sirisha ROBERTO, et al.     Evaluation, treatment, and prevention of vitamin D     deficiency: an Endocrine Society clinical practice     guideline. JCEM. 2011 Jul; 96(7):1911-30. Narrative    Performed at:  [address]  [id number]  : Jessica Padilla MD, Phone:  [phone]   METABOLIC PANEL, COMPREHENSIVE (Collected: 10/23/2020  8:36 AM)   Result Value Ref Range    Glucose 89 65 - 99 mg/dL    BUN 14 8 - 27 mg/dL    Creatinine 0.83 0.57 - 1.00 mg/dL    GFR est non-AA 71 >59 mL/min/1.73    GFR est AA 82 >59 mL/min/1.73    BUN/Creatinine ratio 17 12 - 28    Sodium 142 134 - 144 mmol/L    Potassium 4.2 3.5 - 5.2 mmol/L    Chloride 107 (H) 96 - 106 mmol/L    CO2 21 20 - 29 mmol/L    Calcium 9.9 8.7 - 10.3 mg/dL    Protein, total 7.0 6.0 - 8.5 g/dL    Albumin 4.3 3.7 - 4.7 g/dL    GLOBULIN, TOTAL 2.7 1.5 - 4.5 g/dL    A-G Ratio 1.6 1.2 - 2.2    Bilirubin, total 0.7 0.0 - 1.2 mg/dL    Alk. phosphatase 215 (H) 39 - 117 IU/L    AST (SGOT) 20 0 - 40 IU/L    ALT (SGPT) 16 0 - 32 IU/L    Narrative    Performed at:  [address]  [id number]  : Jessica Padilla MD, Phone:  [phone]       1) Your vitamin D level is 25 which is low.   Goal is over 30.  Please restart once a week vitamin D called Ergocalciferol 50,000 units on a day of your choice. This will be ready for  at the pharmacy today. You will take this for 2 months and we'll see if your level is back to normal at that time and if so, we'll plan on arranging the Reclast infusion at that time. 2) BUN and creatinine are markers of kidney function. Your values are normal.    3) Your alkaline phosphatase (marker of bone turnover from Paget's disease) is still high at 215 but down from 224 in June. Once we treat your Paget's disease with Reclast, this number should come back down to normal.    4) Take the enclosed lab slip to repeat your labs in 2 months and I'll be in touch with the results at that time. Lab follow up: 1/1/21    Sent her the following message in a letter and had Mari[facility] call her with her lab results:    Resulted Orders   VITAMIN D, 25 HYDROXY (Collected: 12/30/2020 10:51 AM)   Result Value Ref Range    VITAMIN D, 25-HYDROXY 33.6 30.0 - 100.0 ng/mL      Comment:      Vitamin D deficiency has been defined by the [address] [place] [address] practice guideline as a  level of serum 25-OH vitamin D less than 20 ng/mL (1,2). The Endocrine Society went on to further define vitamin D  insufficiency as a level between 21 and 29 ng/mL (2). 1. IOM ([place] of Medicine). 2010. Dietary reference     intakes for calcium and D. [facility]: The     [employer]. 2. Kristian MF, Elsy NC, Sirisha ROBERTO, et al.     Evaluation, treatment, and prevention of vitamin D     deficiency: an Endocrine Society clinical practice     guideline. JCEM. 2011 Jul; 96(7):1911-30.       Narrative    Performed at:  [address]  [id number]  : Gaby Barbour MD, Phone:  [phone]   METABOLIC PANEL, COMPREHENSIVE (Collected: 12/30/2020 10:51 AM)   Result Value Ref Range    Glucose 88 65 - 99 mg/dL    BUN 16 8 - 27 mg/dL    Creatinine 0.91 0.57 - 1.00 mg/dL    GFR est non-AA 64 >59 mL/min/1.73    GFR est AA 73 >59 mL/min/1.73    BUN/Creatinine ratio 18 12 - 28    Sodium 143 134 - 144 mmol/L    Potassium 4.6 3.5 - 5.2 mmol/L    Chloride 104 96 - 106 mmol/L    CO2 22 20 - 29 mmol/L    Calcium 10.0 8.7 - 10.3 mg/dL    Protein, total 6.9 6.0 - 8.5 g/dL    Albumin 4.1 3.7 - 4.7 g/dL    GLOBULIN, TOTAL 2.8 1.5 - 4.5 g/dL    A-G Ratio 1.5 1.2 - 2.2    Bilirubin, total 0.6 0.0 - 1.2 mg/dL    Alk. phosphatase 246 (H) 39 - 117 IU/L    AST (SGOT) 18 0 - 40 IU/L    ALT (SGPT) 14 0 - 32 IU/L    Narrative    Performed at:  [address]  [id number]  : Jennifer Rae MD, Phone:  [phone]       Your vitamin D level is 33 which is normal.  Goal is over 30. Please continue to take your current dose of vitamin D to keep your levels at goal.  -------------------------------------------------------------------------------------------------------------------  BUN and creatinine are markers of kidney function. Your values are normal.  -------------------------------------------------------------------------------------------------------------------  Your alkaline phosphatase (marker of bone turnover) is still high at 246 from your Paget's disease. Now that your vitamin D is back to normal, I have placed an order to have your Reclast infusion. Please call the infusion center at [phone] to get this scheduled. -------------------------------------------------------------------------------------------------------------------  Reclast is an IV infusion that is given at the infusion center and involves having an IV placed and receiving an infusion over about 15 minutes. Side effects are a possible flu-like reaction with low grade fevers and chills and body aches over the 24-48 hours after the infusion which can occur in 5-10% of patients.   If this occurs, you can take Tylenol or motrin for these symptoms. Please be sure to drink at least 32 oz of water the day before the infusion, the day of the infusion, and the day after the infusion.  -------------------------------------------------------------------------------------------------------------------  I will have the office call you to schedule a follow up visit for 6 months. Take the enclosed lab slip to repeat your labs prior to your next visit.

## 2020-10-24 ENCOUNTER — TELEPHONE (OUTPATIENT)
Dept: ENDOCRINOLOGY | Age: 70
End: 2020-10-24

## 2020-10-24 LAB
25(OH)D3+25(OH)D2 SERPL-MCNC: 25.3 NG/ML (ref 30–100)
ALBUMIN SERPL-MCNC: 4.3 G/DL (ref 3.7–4.7)
ALBUMIN/GLOB SERPL: 1.6 {RATIO} (ref 1.2–2.2)
ALP SERPL-CCNC: 215 IU/L (ref 39–117)
ALT SERPL-CCNC: 16 IU/L (ref 0–32)
AST SERPL-CCNC: 20 IU/L (ref 0–40)
BILIRUB SERPL-MCNC: 0.7 MG/DL (ref 0–1.2)
BUN SERPL-MCNC: 14 MG/DL (ref 8–27)
BUN/CREAT SERPL: 17 (ref 12–28)
CALCIUM SERPL-MCNC: 9.9 MG/DL (ref 8.7–10.3)
CHLORIDE SERPL-SCNC: 107 MMOL/L (ref 96–106)
CO2 SERPL-SCNC: 21 MMOL/L (ref 20–29)
CREAT SERPL-MCNC: 0.83 MG/DL (ref 0.57–1)
GLOBULIN SER CALC-MCNC: 2.7 G/DL (ref 1.5–4.5)
GLUCOSE SERPL-MCNC: 89 MG/DL (ref 65–99)
POTASSIUM SERPL-SCNC: 4.2 MMOL/L (ref 3.5–5.2)
PROT SERPL-MCNC: 7 G/DL (ref 6–8.5)
SODIUM SERPL-SCNC: 142 MMOL/L (ref 134–144)

## 2020-10-24 RX ORDER — ERGOCALCIFEROL 1.25 MG/1
50000 CAPSULE ORAL
Qty: 13 CAP | Refills: 3 | Status: SHIPPED | OUTPATIENT
Start: 2020-10-24

## 2020-10-24 NOTE — TELEPHONE ENCOUNTER
Please call her with her lab results and let her know I sent her the following message in a lab letter:    1) Your vitamin D level is 25 which is low. Goal is over 30. Please restart once a week vitamin D called Ergocalciferol 50,000 units on a day of your choice. This will be ready for  at the pharmacy today. You will take this for 2 months and we'll see if your level is back to normal at that time and if so, we'll plan on arranging the Reclast infusion at that time. 2) BUN and creatinine are markers of kidney function. Your values are normal.    3) Your alkaline phosphatase (marker of bone turnover from Paget's disease) is still high at 215 but down from 224 in June. Once we treat your Paget's disease with Reclast, this number should come back down to normal.    4) Take the enclosed lab slip to repeat your labs in 2 months and I'll be in touch with the results at that time.

## 2020-12-24 DIAGNOSIS — E55.9 VITAMIN D DEFICIENCY: ICD-10-CM

## 2020-12-24 DIAGNOSIS — M88.88 PAGET'S DISEASE OF BONY PELVIS: ICD-10-CM

## 2020-12-31 LAB
25(OH)D3+25(OH)D2 SERPL-MCNC: 33.6 NG/ML (ref 30–100)
ALBUMIN SERPL-MCNC: 4.1 G/DL (ref 3.7–4.7)
ALBUMIN/GLOB SERPL: 1.5 {RATIO} (ref 1.2–2.2)
ALP SERPL-CCNC: 246 IU/L (ref 39–117)
ALT SERPL-CCNC: 14 IU/L (ref 0–32)
AST SERPL-CCNC: 18 IU/L (ref 0–40)
BILIRUB SERPL-MCNC: 0.6 MG/DL (ref 0–1.2)
BUN SERPL-MCNC: 16 MG/DL (ref 8–27)
BUN/CREAT SERPL: 18 (ref 12–28)
CALCIUM SERPL-MCNC: 10 MG/DL (ref 8.7–10.3)
CHLORIDE SERPL-SCNC: 104 MMOL/L (ref 96–106)
CO2 SERPL-SCNC: 22 MMOL/L (ref 20–29)
CREAT SERPL-MCNC: 0.91 MG/DL (ref 0.57–1)
GLOBULIN SER CALC-MCNC: 2.8 G/DL (ref 1.5–4.5)
GLUCOSE SERPL-MCNC: 88 MG/DL (ref 65–99)
POTASSIUM SERPL-SCNC: 4.6 MMOL/L (ref 3.5–5.2)
PROT SERPL-MCNC: 6.9 G/DL (ref 6–8.5)
SODIUM SERPL-SCNC: 143 MMOL/L (ref 134–144)

## 2021-01-01 ENCOUNTER — TELEPHONE (OUTPATIENT)
Dept: ENDOCRINOLOGY | Age: 71
End: 2021-01-01

## 2021-01-01 PROBLEM — M88.88 PAGET'S DISEASE OF BONY PELVIS: Status: ACTIVE | Noted: 2021-01-01

## 2021-01-01 RX ORDER — ACETAMINOPHEN 325 MG/1
650 TABLET ORAL AS NEEDED
Status: CANCELLED
Start: 2021-01-04

## 2021-01-01 RX ORDER — SODIUM CHLORIDE 9 MG/ML
10 INJECTION INTRAMUSCULAR; INTRAVENOUS; SUBCUTANEOUS AS NEEDED
Status: CANCELLED | OUTPATIENT
Start: 2021-01-04

## 2021-01-01 RX ORDER — HEPARIN 100 UNIT/ML
300-500 SYRINGE INTRAVENOUS AS NEEDED
Status: CANCELLED
Start: 2021-01-04

## 2021-01-01 RX ORDER — SODIUM CHLORIDE 9 MG/ML
25 INJECTION, SOLUTION INTRAVENOUS CONTINUOUS
Status: CANCELLED | OUTPATIENT
Start: 2021-01-04

## 2021-01-01 RX ORDER — EPINEPHRINE 1 MG/ML
0.3 INJECTION, SOLUTION, CONCENTRATE INTRAVENOUS AS NEEDED
Status: CANCELLED | OUTPATIENT
Start: 2021-01-04

## 2021-01-01 RX ORDER — ZOLEDRONIC ACID 5 MG/100ML
5 INJECTION, SOLUTION INTRAVENOUS ONCE
Status: CANCELLED | OUTPATIENT
Start: 2021-01-04 | End: 2021-01-04

## 2021-01-01 RX ORDER — DIPHENHYDRAMINE HYDROCHLORIDE 50 MG/ML
25 INJECTION, SOLUTION INTRAMUSCULAR; INTRAVENOUS AS NEEDED
Status: CANCELLED
Start: 2021-01-04

## 2021-01-01 RX ORDER — DIPHENHYDRAMINE HYDROCHLORIDE 50 MG/ML
50 INJECTION, SOLUTION INTRAMUSCULAR; INTRAVENOUS AS NEEDED
Status: CANCELLED
Start: 2021-01-04

## 2021-01-01 RX ORDER — HYDROCORTISONE SODIUM SUCCINATE 100 MG/2ML
100 INJECTION, POWDER, FOR SOLUTION INTRAMUSCULAR; INTRAVENOUS AS NEEDED
Status: CANCELLED | OUTPATIENT
Start: 2021-01-04

## 2021-01-01 RX ORDER — ALBUTEROL SULFATE 0.83 MG/ML
2.5 SOLUTION RESPIRATORY (INHALATION) AS NEEDED
Status: CANCELLED
Start: 2021-01-04

## 2021-01-01 RX ORDER — SODIUM CHLORIDE 0.9 % (FLUSH) 0.9 %
10 SYRINGE (ML) INJECTION AS NEEDED
Status: CANCELLED | OUTPATIENT
Start: 2021-01-14 | End: 2021-01-14

## 2021-01-01 RX ORDER — ONDANSETRON 2 MG/ML
8 INJECTION INTRAMUSCULAR; INTRAVENOUS AS NEEDED
Status: CANCELLED | OUTPATIENT
Start: 2021-01-04

## 2021-01-01 NOTE — TELEPHONE ENCOUNTER
Please call her with her lab results and let her know I sent her the following message in a lab letter: Your vitamin D level is 33 which is normal.  Goal is over 30. Please continue to take your current dose of vitamin D to keep your levels at goal.  -------------------------------------------------------------------------------------------------------------------  BUN and creatinine are markers of kidney function. Your values are normal.  -------------------------------------------------------------------------------------------------------------------  Your alkaline phosphatase (marker of bone turnover) is still high at 246 from your Paget's disease. Now that your vitamin D is back to normal, I have placed an order to have your Reclast infusion. Please call the infusion center at [phone] to get this scheduled. -------------------------------------------------------------------------------------------------------------------  Reclast is an IV infusion that is given at the infusion center and involves having an IV placed and receiving an infusion over about 15 minutes. Side effects are a possible flu-like reaction with low grade fevers and chills and body aches over the 24-48 hours after the infusion which can occur in 5-10% of patients. If this occurs, you can take Tylenol or motrin for these symptoms. Please be sure to drink at least 32 oz of water the day before the infusion, the day of the infusion, and the day after the infusion.  -------------------------------------------------------------------------------------------------------------------  I will have the office call you to schedule a follow up visit for 6 months. Take the enclosed lab slip to repeat your labs prior to your next visit.

## 2021-01-14 ENCOUNTER — HOSPITAL ENCOUNTER (OUTPATIENT)
Dept: INFUSION THERAPY | Age: 71
Discharge: HOME | End: 2021-01-14
Payer: COMMERCIAL

## 2021-01-14 VITALS
HEIGHT: 62 IN | RESPIRATION RATE: 18 BRPM | SYSTOLIC BLOOD PRESSURE: 146 MMHG | TEMPERATURE: 97.7 F | BODY MASS INDEX: 32.48 KG/M2 | HEART RATE: 62 BPM | WEIGHT: 176.5 LBS | DIASTOLIC BLOOD PRESSURE: 84 MMHG

## 2021-01-14 DIAGNOSIS — M88.88 PAGET'S DISEASE OF BONY PELVIS: Primary | ICD-10-CM

## 2021-01-14 PROCEDURE — 96374 THER/PROPH/DIAG INJ IV PUSH: CPT

## 2021-01-14 PROCEDURE — 74011250636 HC RX REV CODE- 250/636: Performed by: INTERNAL MEDICINE

## 2021-01-14 PROCEDURE — 74011000258 HC RX REV CODE- 258: Performed by: INTERNAL MEDICINE

## 2021-01-14 RX ORDER — SODIUM CHLORIDE 9 MG/ML
25 INJECTION, SOLUTION INTRAVENOUS CONTINUOUS
Status: CANCELLED | OUTPATIENT
Start: 2022-01-13

## 2021-01-14 RX ORDER — EPINEPHRINE 1 MG/ML
0.3 INJECTION, SOLUTION, CONCENTRATE INTRAVENOUS AS NEEDED
Status: CANCELLED | OUTPATIENT
Start: 2022-01-13

## 2021-01-14 RX ORDER — DIPHENHYDRAMINE HYDROCHLORIDE 50 MG/ML
25 INJECTION, SOLUTION INTRAMUSCULAR; INTRAVENOUS AS NEEDED
Status: CANCELLED
Start: 2022-01-13

## 2021-01-14 RX ORDER — ZOLEDRONIC ACID 5 MG/100ML
5 INJECTION, SOLUTION INTRAVENOUS ONCE
Status: COMPLETED | OUTPATIENT
Start: 2021-01-14 | End: 2021-01-14

## 2021-01-14 RX ORDER — HEPARIN 100 UNIT/ML
300-500 SYRINGE INTRAVENOUS AS NEEDED
Status: CANCELLED
Start: 2022-01-13

## 2021-01-14 RX ORDER — SODIUM CHLORIDE 0.9 % (FLUSH) 0.9 %
10 SYRINGE (ML) INJECTION AS NEEDED
Status: CANCELLED | OUTPATIENT
Start: 2021-01-14 | End: 2021-01-14

## 2021-01-14 RX ORDER — ZOLEDRONIC ACID 5 MG/100ML
5 INJECTION, SOLUTION INTRAVENOUS ONCE
Status: CANCELLED | OUTPATIENT
Start: 2022-01-13 | End: 2022-01-13

## 2021-01-14 RX ORDER — ALBUTEROL SULFATE 0.83 MG/ML
2.5 SOLUTION RESPIRATORY (INHALATION) AS NEEDED
Status: CANCELLED
Start: 2022-01-13

## 2021-01-14 RX ORDER — ONDANSETRON 2 MG/ML
8 INJECTION INTRAMUSCULAR; INTRAVENOUS AS NEEDED
Status: CANCELLED | OUTPATIENT
Start: 2022-01-13

## 2021-01-14 RX ORDER — DIPHENHYDRAMINE HYDROCHLORIDE 50 MG/ML
50 INJECTION, SOLUTION INTRAMUSCULAR; INTRAVENOUS AS NEEDED
Status: CANCELLED
Start: 2022-01-13

## 2021-01-14 RX ORDER — SODIUM CHLORIDE 0.9 % (FLUSH) 0.9 %
10 SYRINGE (ML) INJECTION AS NEEDED
Status: DISPENSED | OUTPATIENT
Start: 2021-01-14 | End: 2021-01-14

## 2021-01-14 RX ORDER — SODIUM CHLORIDE 9 MG/ML
10 INJECTION INTRAMUSCULAR; INTRAVENOUS; SUBCUTANEOUS AS NEEDED
Status: CANCELLED | OUTPATIENT
Start: 2022-01-13

## 2021-01-14 RX ORDER — SODIUM CHLORIDE 9 MG/ML
25 INJECTION, SOLUTION INTRAVENOUS CONTINUOUS
Status: DISPENSED | OUTPATIENT
Start: 2021-01-14 | End: 2021-01-14

## 2021-01-14 RX ORDER — HYDROCORTISONE SODIUM SUCCINATE 100 MG/2ML
100 INJECTION, POWDER, FOR SOLUTION INTRAMUSCULAR; INTRAVENOUS AS NEEDED
Status: CANCELLED | OUTPATIENT
Start: 2022-01-13

## 2021-01-14 RX ORDER — ACETAMINOPHEN 325 MG/1
650 TABLET ORAL AS NEEDED
Status: CANCELLED
Start: 2022-01-13

## 2021-01-14 RX ADMIN — Medication 10 ML: at 11:15

## 2021-01-14 RX ADMIN — SODIUM CHLORIDE 25 ML/HR: 900 INJECTION, SOLUTION INTRAVENOUS at 11:13

## 2021-01-14 RX ADMIN — ZOLEDRONIC ACID 5 MG: 5 INJECTION, SOLUTION INTRAVENOUS at 11:13

## 2021-01-14 NOTE — PROGRESS NOTES
Outpatient Infusion Center Progress Note    3147  Pt arrived in stable condition for Reclast    Assessment completed, patient reports no complaints. Patient denied having any symptoms of COVID-19, i.e. SOB, coughing, fever, or generally not feeling well. Also denies having been exposed to COVID-19 recently or having had any recent contact with family/friend that has a pending COVID test.     24G PIV established in right arm without issue and positive blood return noted. Labs obtained per order and sent for processing. Patient Vitals for the past 12 hrs:   Temp Pulse Resp BP   01/14/21 1149 -- 62 -- (!) 146/84   01/14/21 1052 97.7 °F (36.5 °C) 68 18 (!) 140/88        Labs drawn on 12/30/2020. IoCalcium=10.0, CrCl=55.6    The following medications administered:  Medications Administered     0.9% sodium chloride infusion     Admin Date  01/14/2021 Action  New Bag Dose  25 mL/hr Rate  25 mL/hr Route  IntraVENous Administered By  Edward Shabazz RN          sodium chloride (NS) flush 10 mL     Admin Date  01/14/2021 Action  Given Dose  10 mL Route  IntraVENous Administered By  Edward Shabazz RN          zoledronic acid (RECLAST) IVPB 5 mg     Admin Date  01/14/2021 Action  Given Dose  5 mg Rate  400 mL/hr Route  IntraVENous Administered By  Edward Shabazz RN                Pt tolerated treatment well. IV flushed per policy and removed, 2x2 and coban placed. Pt provided with first dose education on possible side effects of medication along with discharge instructions. Pt verbalized understanding. 1200  Pt discharged in no acute distress.  Next appointment:    Future Appointments   Date Time Provider Johnny eCnteno   7/22/2021 11:30 AM Belkis Smith MD RDE [facility] [address] BS AMB

## 2021-03-25 ENCOUNTER — TRANSCRIBE ORDER (OUTPATIENT)
Dept: SCHEDULING | Age: 71
End: 2021-03-25

## 2021-03-25 DIAGNOSIS — N64.4 BREAST PAIN: Primary | ICD-10-CM

## 2021-03-25 DIAGNOSIS — N64.4 BREAST PAIN, LEFT: ICD-10-CM

## 2021-03-25 DIAGNOSIS — Z85.3 HX OF BREAST CANCER: ICD-10-CM

## 2021-03-30 ENCOUNTER — HOSPITAL ENCOUNTER (OUTPATIENT)
Dept: MRI IMAGING | Age: 71
Discharge: HOME | End: 2021-03-30
Payer: MEDICARE

## 2021-03-30 VITALS — BODY MASS INDEX: 30.18 KG/M2 | WEIGHT: 165 LBS

## 2021-03-30 DIAGNOSIS — N64.4 BREAST PAIN, LEFT: ICD-10-CM

## 2021-03-30 DIAGNOSIS — Z85.3 HX OF BREAST CANCER: ICD-10-CM

## 2021-03-30 DIAGNOSIS — N64.4 BREAST PAIN: ICD-10-CM

## 2021-03-30 PROCEDURE — A9585 GADOBUTROL INJECTION: HCPCS | Performed by: RADIOLOGY

## 2021-03-30 PROCEDURE — 77049 MRI BREAST C-+ W/CAD BI: CPT

## 2021-03-30 PROCEDURE — 77030021566

## 2021-03-30 PROCEDURE — 74011250636 HC RX REV CODE- 250/636: Performed by: RADIOLOGY

## 2021-03-30 RX ADMIN — GADOBUTROL 7 ML: 604.72 INJECTION INTRAVENOUS at 13:00

## 2021-06-12 ENCOUNTER — HOSPITAL ENCOUNTER (EMERGENCY)
Age: 71
Discharge: HOME | End: 2021-06-13
Attending: STUDENT IN AN ORGANIZED HEALTH CARE EDUCATION/TRAINING PROGRAM
Payer: MEDICARE

## 2021-06-12 ENCOUNTER — APPOINTMENT (OUTPATIENT)
Dept: CT IMAGING | Age: 71
End: 2021-06-12
Attending: EMERGENCY MEDICINE
Payer: MEDICARE

## 2021-06-12 DIAGNOSIS — K52.9 COLITIS: Primary | ICD-10-CM

## 2021-06-12 LAB
ALBUMIN SERPL-MCNC: 3.5 G/DL (ref 3.5–5)
ALBUMIN/GLOB SERPL: 0.8 {RATIO} (ref 1.1–2.2)
ALP SERPL-CCNC: 115 U/L (ref 45–117)
ALT SERPL-CCNC: 36 U/L (ref 12–78)
ANION GAP SERPL CALC-SCNC: 4 MMOL/L (ref 5–15)
AST SERPL-CCNC: 28 U/L (ref 15–37)
BASOPHILS # BLD: 0 K/UL (ref 0–0.1)
BASOPHILS NFR BLD: 0 % (ref 0–1)
BILIRUB SERPL-MCNC: 0.9 MG/DL (ref 0.2–1)
BUN SERPL-MCNC: 15 MG/DL (ref 6–20)
BUN/CREAT SERPL: 19 (ref 12–20)
CALCIUM SERPL-MCNC: 8.6 MG/DL (ref 8.5–10.1)
CHLORIDE SERPL-SCNC: 110 MMOL/L (ref 97–108)
CO2 SERPL-SCNC: 24 MMOL/L (ref 21–32)
COMMENT, HOLDF: NORMAL
CREAT SERPL-MCNC: 0.77 MG/DL (ref 0.55–1.02)
DIFFERENTIAL METHOD BLD: ABNORMAL
EOSINOPHIL # BLD: 0.1 K/UL (ref 0–0.4)
EOSINOPHIL NFR BLD: 1 % (ref 0–7)
ERYTHROCYTE [DISTWIDTH] IN BLOOD BY AUTOMATED COUNT: 14.6 % (ref 11.5–14.5)
GLOBULIN SER CALC-MCNC: 4.2 G/DL (ref 2–4)
GLUCOSE SERPL-MCNC: 104 MG/DL (ref 65–100)
HCT VFR BLD AUTO: 38.4 % (ref 35–47)
HGB BLD-MCNC: 12.2 G/DL (ref 11.5–16)
IMM GRANULOCYTES # BLD AUTO: 0 K/UL (ref 0–0.04)
IMM GRANULOCYTES NFR BLD AUTO: 0 % (ref 0–0.5)
LIPASE SERPL-CCNC: 24 U/L (ref 73–393)
LYMPHOCYTES # BLD: 1.6 K/UL (ref 0.8–3.5)
LYMPHOCYTES NFR BLD: 14 % (ref 12–49)
MCH RBC QN AUTO: 26 PG (ref 26–34)
MCHC RBC AUTO-ENTMCNC: 31.8 G/DL (ref 30–36.5)
MCV RBC AUTO: 81.9 FL (ref 80–99)
MONOCYTES # BLD: 0.7 K/UL (ref 0–1)
MONOCYTES NFR BLD: 6 % (ref 5–13)
NEUTS SEG # BLD: 9.1 K/UL (ref 1.8–8)
NEUTS SEG NFR BLD: 79 % (ref 32–75)
NRBC # BLD: 0 K/UL (ref 0–0.01)
NRBC BLD-RTO: 0 PER 100 WBC
PLATELET # BLD AUTO: 318 K/UL (ref 150–400)
PMV BLD AUTO: 9.6 FL (ref 8.9–12.9)
POTASSIUM SERPL-SCNC: 4 MMOL/L (ref 3.5–5.1)
PROT SERPL-MCNC: 7.7 G/DL (ref 6.4–8.2)
RBC # BLD AUTO: 4.69 M/UL (ref 3.8–5.2)
SAMPLES BEING HELD,HOLD: NORMAL
SODIUM SERPL-SCNC: 138 MMOL/L (ref 136–145)
WBC # BLD AUTO: 11.5 K/UL (ref 3.6–11)

## 2021-06-12 PROCEDURE — 83690 ASSAY OF LIPASE: CPT

## 2021-06-12 PROCEDURE — 80053 COMPREHEN METABOLIC PANEL: CPT

## 2021-06-12 PROCEDURE — 96361 HYDRATE IV INFUSION ADD-ON: CPT

## 2021-06-12 PROCEDURE — 74011250636 HC RX REV CODE- 250/636: Performed by: STUDENT IN AN ORGANIZED HEALTH CARE EDUCATION/TRAINING PROGRAM

## 2021-06-12 PROCEDURE — 96375 TX/PRO/DX INJ NEW DRUG ADDON: CPT

## 2021-06-12 PROCEDURE — 85025 COMPLETE CBC W/AUTO DIFF WBC: CPT

## 2021-06-12 PROCEDURE — 36415 COLL VENOUS BLD VENIPUNCTURE: CPT

## 2021-06-12 PROCEDURE — 74176 CT ABD & PELVIS W/O CONTRAST: CPT

## 2021-06-12 PROCEDURE — 96374 THER/PROPH/DIAG INJ IV PUSH: CPT

## 2021-06-12 PROCEDURE — 99284 EMERGENCY DEPT VISIT MOD MDM: CPT

## 2021-06-12 RX ORDER — KETOROLAC TROMETHAMINE 30 MG/ML
15 INJECTION, SOLUTION INTRAMUSCULAR; INTRAVENOUS
Status: COMPLETED | OUTPATIENT
Start: 2021-06-12 | End: 2021-06-12

## 2021-06-12 RX ORDER — ONDANSETRON 2 MG/ML
4 INJECTION INTRAMUSCULAR; INTRAVENOUS
Status: COMPLETED | OUTPATIENT
Start: 2021-06-12 | End: 2021-06-12

## 2021-06-12 RX ADMIN — KETOROLAC TROMETHAMINE 15 MG: 30 INJECTION, SOLUTION INTRAMUSCULAR; INTRAVENOUS at 23:04

## 2021-06-12 RX ADMIN — SODIUM CHLORIDE 1000 ML: 9 INJECTION, SOLUTION INTRAVENOUS at 23:04

## 2021-06-12 RX ADMIN — ONDANSETRON 4 MG: 2 INJECTION INTRAMUSCULAR; INTRAVENOUS at 23:04

## 2021-06-13 VITALS
HEIGHT: 60 IN | RESPIRATION RATE: 18 BRPM | SYSTOLIC BLOOD PRESSURE: 116 MMHG | HEART RATE: 98 BPM | BODY MASS INDEX: 34.76 KG/M2 | WEIGHT: 177.03 LBS | DIASTOLIC BLOOD PRESSURE: 63 MMHG | OXYGEN SATURATION: 100 % | TEMPERATURE: 97.7 F

## 2021-06-13 RX ORDER — METRONIDAZOLE 500 MG/1
500 TABLET ORAL 2 TIMES DAILY
Qty: 20 TABLET | Refills: 0 | Status: SHIPPED | OUTPATIENT
Start: 2021-06-13 | End: 2021-06-23

## 2021-06-13 RX ORDER — CIPROFLOXACIN 500 MG/1
500 TABLET ORAL 2 TIMES DAILY
Qty: 20 TABLET | Refills: 0 | Status: SHIPPED | OUTPATIENT
Start: 2021-06-13 | End: 2021-06-23

## 2021-06-13 RX ORDER — ONDANSETRON 4 MG/1
4 TABLET, ORALLY DISINTEGRATING ORAL
Qty: 8 TABLET | Refills: 0 | Status: SHIPPED | OUTPATIENT
Start: 2021-06-13 | End: 2021-07-22

## 2021-06-13 NOTE — ED PROVIDER NOTES
Patient is a 24-year-old female present emergency department with abdominal pain. Patient states that she has been having lower abdominal pain with yellow vomit she has been nauseated and is been having diarrhea. She says that symptoms started last night and progressively gotten worse. Patient denies any fevers denies any chest pain, shortness of breath, dysuria. Patient is concerned as she has a breast cancer survivor she is also had a history of small bowel obstructions, a renal mass and multiple abdominal surgeries. Past Medical History:   Diagnosis Date    Breast cancer ([facility] [address] 2014    H/O partial adrenalectomy ([facility] [address] [facility]     HTN (hypertension)     Low vitamin D level     Obesity, Class I, BMI 30-34.9     Paget disease of bone     Prediabetes 01/20/2020    5.9    SBO (small bowel obstruction) ([facility] [address] 1995       Past Surgical History:   Procedure Laterality Date    HX ABDOMINAL LAPAROSCOPY      reports having mass removed near kidney (adrenal gland) to address HTN.      HX BLADDER SUSPENSION  2019    HX BREAST LUMPECTOMY Left 2014    HX CATARACT REMOVAL Bilateral     HX CYST REMOVAL      Eyelid    HX HEENT  03/2020    All teeth pulled    HX KNEE REPLACEMENT Left 1998    HX ORTHOPAEDIC  06/2020    left shoulder arthroplasty    HX TOTAL COLECTOMY  1995         Family History:   Problem Relation Age of Onset    Diabetes Mother     Lung Cancer Mother     Hypertension Mother     Lung Cancer Father     Anesth Problems Neg Hx     Deep Vein Thrombosis Neg Hx        Social History     Socioeconomic History    Marital status:      Spouse name: Not on file    Number of children: Not on file    Years of education: Not on file    Highest education level: Not on file   Occupational History    Not on file   Tobacco Use    Smoking status: Never Smoker    Smokeless tobacco: Never Used   Substance and Sexual Activity    Alcohol use: No    Drug use: Never    Sexual activity: Not on file Other Topics Concern    Not on file   Social History Narrative    Not on file     Social Determinants of Health     Financial Resource Strain:     Difficulty of Paying Living Expenses:    Food Insecurity:     Worried About Running Out of Food in the Last Year:     [address] in the Last Year:    Transportation Needs:     Lack of Transportation (Medical):  Lack of Transportation (Non-Medical):    Physical Activity:     Days of Exercise per Week:     Minutes of Exercise per Session:    Stress:     Feeling of Stress :    Social Connections:     Frequency of Communication with Friends and Family:     Frequency of Social Gatherings with Friends and Family:     Attends Mu-ism Services:     Active Member of Clubs or Organizations:     Attends Club or Organization Meetings:     Marital Status:    Intimate Partner Violence:     Fear of Current or Ex-Partner:     Emotionally Abused:     Physically Abused:     Sexually Abused: ALLERGIES: Latex    Review of Systems   Constitutional: Positive for activity change and appetite change. Gastrointestinal: Positive for abdominal distention, abdominal pain, diarrhea, nausea and vomiting. Negative for constipation. All other systems reviewed and are negative. Vitals:    06/12/21 2111 06/12/21 2203 06/12/21 2204 06/12/21 2245   BP: 123/79 137/75  137/80   Pulse: 98      Resp: 18      Temp: 97.7 °F (36.5 °C)      SpO2: 99%  100% 100%   Weight: 80.3 kg (177 lb 0.5 oz)      Height: 5' (1.524 m)               Physical Exam  Vitals and nursing note reviewed. Constitutional:       Appearance: She is well-developed. HENT:      Head: Normocephalic and atraumatic. Eyes:      Extraocular Movements: Extraocular movements intact. Pupils: Pupils are equal, round, and reactive to light. Cardiovascular:      Rate and Rhythm: Normal rate and regular rhythm. Heart sounds: Normal heart sounds.    Abdominal:      General: Abdomen is protuberant. Bowel sounds are decreased. Palpations: Abdomen is soft. Tenderness: There is generalized abdominal tenderness. Skin:     General: Skin is warm. Neurological:      General: No focal deficit present. Mental Status: She is alert and oriented to person, place, and time. Psychiatric:         Mood and Affect: Mood is anxious. Behavior: Behavior normal.          MDM  Number of Diagnoses or Management Options  Colitis  Diagnosis management comments: A/P: Small bowel obstruction, infectious diarrhea, gastroenteritis. 56-year-old female presenting with abdominal pain, abdominal distention, nausea. Patient has a history of breast cancer, renal mass, small bowel obstruction in the past.  Will obtain labs, pain control, CT abdomen pelvis with contrast.       Amount and/or Complexity of Data Reviewed  Clinical lab tests: ordered and reviewed  Tests in the radiology section of CPT®: reviewed and ordered    Risk of Complications, Morbidity, and/or Mortality  Presenting problems: moderate  Diagnostic procedures: moderate  Management options: moderate    Patient Progress  Patient progress: stable         Procedures      11:07 PM  Change of shift. Care of patient signed over to Laura Elkins MD.  Bedside handoff complete. Awaiting labs/imaging. 1:19 AM  Blood work unremarkable except for mildly elevated white count. CT scan showing evidence of colitis in the large colon. Patient feeling much improved and stable vital signs. Tolerating p.o. challenge. Stable for discharge home at this point with prescriptions for antibiotics and Zofran. She is to follow-up with her primary care doctor if she does not improve as expected. She can return to the ED with any new or worsening symptoms.

## 2021-06-13 NOTE — ED TRIAGE NOTES
Patient arrives with complaint of lower abdominal pain with \"yellow\" emesis, nausea, and diarrhea. States symptoms began last night around 2000. Denies chest pain, SOB, urinary changes. Denies any recent sick contact. States has taken pepto bismol two hours PTA with no relief. Hx of breast cancer (2013).

## 2021-06-15 DIAGNOSIS — E55.9 VITAMIN D DEFICIENCY: ICD-10-CM

## 2021-06-15 DIAGNOSIS — M88.88 PAGET'S DISEASE OF BONY PELVIS: ICD-10-CM

## 2021-07-21 LAB
25(OH)D3+25(OH)D2 SERPL-MCNC: 36.1 NG/ML (ref 30–100)
ALBUMIN SERPL-MCNC: 4.1 G/DL (ref 3.7–4.7)
ALBUMIN/GLOB SERPL: 1.5 {RATIO} (ref 1.2–2.2)
ALP SERPL-CCNC: 116 IU/L (ref 48–121)
ALT SERPL-CCNC: 14 IU/L (ref 0–32)
AST SERPL-CCNC: 18 IU/L (ref 0–40)
BILIRUB SERPL-MCNC: 0.4 MG/DL (ref 0–1.2)
BUN SERPL-MCNC: 21 MG/DL (ref 8–27)
BUN/CREAT SERPL: 22 (ref 12–28)
CALCIUM SERPL-MCNC: 9.8 MG/DL (ref 8.7–10.3)
CHLORIDE SERPL-SCNC: 108 MMOL/L (ref 96–106)
CO2 SERPL-SCNC: 22 MMOL/L (ref 20–29)
CREAT SERPL-MCNC: 0.95 MG/DL (ref 0.57–1)
GLOBULIN SER CALC-MCNC: 2.8 G/DL (ref 1.5–4.5)
GLUCOSE SERPL-MCNC: 92 MG/DL (ref 65–99)
POTASSIUM SERPL-SCNC: 4.4 MMOL/L (ref 3.5–5.2)
PROT SERPL-MCNC: 6.9 G/DL (ref 6–8.5)
SODIUM SERPL-SCNC: 144 MMOL/L (ref 134–144)

## 2021-07-22 ENCOUNTER — OFFICE VISIT (OUTPATIENT)
Dept: ENDOCRINOLOGY | Age: 71
End: 2021-07-22
Payer: MEDICARE

## 2021-07-22 VITALS
BODY MASS INDEX: 35.34 KG/M2 | SYSTOLIC BLOOD PRESSURE: 132 MMHG | WEIGHT: 180 LBS | HEIGHT: 60 IN | DIASTOLIC BLOOD PRESSURE: 76 MMHG | HEART RATE: 68 BPM

## 2021-07-22 DIAGNOSIS — E55.9 VITAMIN D DEFICIENCY: ICD-10-CM

## 2021-07-22 DIAGNOSIS — M88.88 PAGET'S DISEASE OF BONY PELVIS: Primary | ICD-10-CM

## 2021-07-22 PROCEDURE — 99214 OFFICE O/P EST MOD 30 MIN: CPT | Performed by: INTERNAL MEDICINE

## 2021-07-22 PROCEDURE — G8400 PT W/DXA NO RESULTS DOC: HCPCS | Performed by: INTERNAL MEDICINE

## 2021-07-22 PROCEDURE — G8417 CALC BMI ABV UP PARAM F/U: HCPCS | Performed by: INTERNAL MEDICINE

## 2021-07-22 PROCEDURE — 1090F PRES/ABSN URINE INCON ASSESS: CPT | Performed by: INTERNAL MEDICINE

## 2021-07-22 PROCEDURE — G8536 NO DOC ELDER MAL SCRN: HCPCS | Performed by: INTERNAL MEDICINE

## 2021-07-22 PROCEDURE — G8427 DOCREV CUR MEDS BY ELIG CLIN: HCPCS | Performed by: INTERNAL MEDICINE

## 2021-07-22 PROCEDURE — 1101F PT FALLS ASSESS-DOCD LE1/YR: CPT | Performed by: INTERNAL MEDICINE

## 2021-07-22 PROCEDURE — G8432 DEP SCR NOT DOC, RNG: HCPCS | Performed by: INTERNAL MEDICINE

## 2021-07-22 PROCEDURE — G9711 PT HX TOT COL OR COLON CA: HCPCS | Performed by: INTERNAL MEDICINE

## 2021-07-22 RX ORDER — CHOLECALCIFEROL (VITAMIN D3) 125 MCG
CAPSULE ORAL
COMMUNITY

## 2021-07-22 NOTE — PROGRESS NOTES
Chief Complaint   Patient presents with    Other     Pagets    Vitamin D Deficiency     History of Present Illness: Inetta Najjar is a 69 y.o. female here for follow up of Paget's disease. She has been compliant with vitamin D every Sunday. Got her Reclast infusion in 1/21 and tolerated this well. Had an ER visit in 6/21 for colitis and was given cipro and flagyl and one of these didn't agree with her but can't remember which and will let me know. Has a new PCP but will let me know their name. Has not had any pain over her left hip where her Paget's is. Does sometimes have some pain in her legs at night. Current Outpatient Medications   Medication Sig    naproxen sodium (Aleve) 220 mg cap Take  by mouth.  ergocalciferol (ERGOCALCIFEROL) 1,250 mcg (50,000 unit) capsule Take 1 Cap by mouth every seven (7) days. No current facility-administered medications for this visit. Allergies   Allergen Reactions    Latex Contact Dermatitis     Review of Systems: PER HPI    Physical Examination:  Blood pressure 132/76, pulse 68, height 5' (1.524 m), weight 180 lb (81.6 kg).   - General: pleasant, no distress, good eye contact   - Cardiovascular: regular, normal rate, nl s1 and s2, no m/r/g   - Respiratory: clear to auscultation bilaterally   - Integumentary: skin is normal, no edema  - Neurological: reflexes 2+ at biceps, no tremors  - Psychiatric: normal mood and affect    Data Reviewed:   Component      Latest Ref Rng & Units 7/20/2021 7/20/2021           8:05 AM  8:05 AM   Glucose      65 - 99 mg/dL 92    BUN      8 - 27 mg/dL 21    Creatinine      0.57 - 1.00 mg/dL 0.95    GFR est non-AA      >59 mL/min/1.73 60    GFR est AA      >59 mL/min/1.73 70    BUN/Creatinine ratio      12 - 28 22    Sodium      134 - 144 mmol/L 144    Potassium      3.5 - 5.2 mmol/L 4.4    Chloride      96 - 106 mmol/L 108 (H)    CO2      20 - 29 mmol/L 22    Calcium      8.7 - 10.3 mg/dL 9.8    Protein, total      6.0 - 8.5 g/dL 6.9    Albumin      3.7 - 4.7 g/dL 4.1    GLOBULIN, TOTAL      1.5 - 4.5 g/dL 2.8    A-G Ratio      1.2 - 2.2 1.5    Bilirubin, total      0.0 - 1.2 mg/dL 0.4    Alk. phosphatase      48 - 121 IU/L 116    AST      0 - 40 IU/L 18    ALT      0 - 32 IU/L 14    VITAMIN D, 25-HYDROXY      30.0 - 100.0 ng/mL  36.1     Assessment/Plan:     1. Paget's disease of bony pelvis: She had a bone scan in 11/16 that showed: \"Heterogeneously increased uptake involves left iliac bone including iliac crest, superior and inferior pubic rami and left acetabulum, correlating with the heterogeneous sclerotic changes seen on multiple prior CTs glands, including earliest available CT from May 18, 2014\". Her alk phos was elevated at 213 in 2/18 and still high at 203 in 1/20 and 224 in 6/20. At first visit with me in 10/20 her level was 215 and then 246 in 12/20. She would benefit from treatment with Reclast which usually normalizes alk phos levels for 5-10 years and she had her invasive dental work and all teeth have been removed and will not be having any implants so I gave her dose of Reclast 5 mg IV in 1/21 and tolerated this well and alk phos normal at 116 in 7/21 so will follow annually to ensure she is in remission.  - will give one time dose of Reclast 5 mg IV once her vitamin D is confirmed to be normal  - check cmp prior to next visit     2. Vitamin D deficiency: level was 22.9 in 2/18. She was previously taking 5000 of D3 daily when she last saw Dr. Phuc Charles in 11/18 but was changed to weekly vitamin D 50K units sometime in the winter of 2020 and took this until running out a few months prior to visit with me in 10/20 and level was 25 so I restarted weekly Ergo 50K units and up to 33.6 in 12/20 and 36 in 7/21  - check Vitamin D 25-OH level prior to next visit  - cont ergo 50K units weekly     Patient Instructions   1) Your vitamin D level is 36 which is normal.  Goal is over 30.   Please continue to take your current dose of vitamin D to keep your levels at goal.  I will provide refills when the prescription runs out. 2) Your alkaline phosphatase (marker of bone turnover) is back to normal after getting the Reclast infusion in January. We will follow this level once a year and as long as it remains normal, you won't need any further treatment. 3) Call [phone] to let us know who your PCP is and whether the ciprofloxacin or the metronidazole was the medication that didn't agree with you so I can put this on your allergy list.        Follow-up and Dispositions    · Return in about 1 year (around 7/22/2022).                Copy sent to:  None at this time

## 2021-07-22 NOTE — PATIENT INSTRUCTIONS
1) Your vitamin D level is 36 which is normal.  Goal is over 30. Please continue to take your current dose of vitamin D to keep your levels at goal.  I will provide refills when the prescription runs out. 2) Your alkaline phosphatase (marker of bone turnover) is back to normal after getting the Reclast infusion in January. We will follow this level once a year and as long as it remains normal, you won't need any further treatment.     3) Call [phone] to let us know who your PCP is and whether the ciprofloxacin or the metronidazole was the medication that didn't agree with you so I can put this on your allergy list.

## 2021-12-03 ENCOUNTER — TRANSCRIBE ORDER (OUTPATIENT)
Dept: GENERAL RADIOLOGY | Age: 71
End: 2021-12-03

## 2021-12-03 ENCOUNTER — HOSPITAL ENCOUNTER (OUTPATIENT)
Dept: GENERAL RADIOLOGY | Age: 71
Discharge: HOME | End: 2021-12-03
Payer: MEDICARE

## 2021-12-03 DIAGNOSIS — M19.90 CHRONIC OSTEOARTHRITIS: Primary | ICD-10-CM

## 2021-12-03 DIAGNOSIS — M19.90 CHRONIC OSTEOARTHRITIS: ICD-10-CM

## 2021-12-03 PROCEDURE — 73130 X-RAY EXAM OF HAND: CPT

## 2021-12-03 PROCEDURE — 73030 X-RAY EXAM OF SHOULDER: CPT

## 2022-03-19 PROBLEM — M19.019 SHOULDER ARTHRITIS: Status: ACTIVE | Noted: 2020-06-22

## 2022-03-19 PROBLEM — M88.88 PAGET'S DISEASE OF BONY PELVIS: Status: ACTIVE | Noted: 2021-01-01

## 2022-07-05 DIAGNOSIS — M88.88 PAGET'S DISEASE OF BONY PELVIS: ICD-10-CM

## 2022-07-05 DIAGNOSIS — E55.9 VITAMIN D DEFICIENCY: ICD-10-CM

## 2022-10-11 ENCOUNTER — TRANSCRIBE ORDER (OUTPATIENT)
Dept: SCHEDULING | Age: 72
End: 2022-10-11

## 2022-10-11 DIAGNOSIS — M77.9 ACUTE CALCIFIC PERIARTHRITIS: ICD-10-CM

## 2022-10-11 DIAGNOSIS — M75.102 ROTATOR CUFF SYNDROME OF LEFT SHOULDER: Primary | ICD-10-CM

## 2022-10-24 ENCOUNTER — HOSPITAL ENCOUNTER (OUTPATIENT)
Dept: MRI IMAGING | Age: 72
Discharge: HOME | End: 2022-10-24
Attending: PHYSICAL MEDICINE & REHABILITATION
Payer: MEDICARE

## 2022-10-24 DIAGNOSIS — M75.102 ROTATOR CUFF SYNDROME OF LEFT SHOULDER: ICD-10-CM

## 2022-10-24 DIAGNOSIS — M77.9 ACUTE CALCIFIC PERIARTHRITIS: ICD-10-CM

## 2022-10-24 PROCEDURE — 73221 MRI JOINT UPR EXTREM W/O DYE: CPT

## 2023-05-26 RX ORDER — ERGOCALCIFEROL 1.25 MG/1
50000 CAPSULE ORAL
COMMUNITY
Start: 2020-10-24

## 2023-05-26 RX ORDER — COVID-19 ANTIGEN TEST
KIT MISCELLANEOUS
COMMUNITY

## 2024-07-23 ENCOUNTER — TRANSCRIBE ORDERS (OUTPATIENT)
Facility: HOSPITAL | Age: 74
End: 2024-07-23

## 2024-07-23 DIAGNOSIS — R22.32 AXILLARY MASS, LEFT: Primary | ICD-10-CM

## 2024-07-31 ENCOUNTER — HOSPITAL ENCOUNTER (OUTPATIENT)
Facility: HOSPITAL | Age: 74
Discharge: HOME | End: 2024-08-03
Payer: MEDICARE

## 2024-07-31 DIAGNOSIS — R22.32 AXILLARY MASS, LEFT: ICD-10-CM

## 2024-07-31 PROCEDURE — 76882 US LMTD JT/FCL EVL NVASC XTR: CPT

## (undated) DEVICE — STRYKER PERFORMANCE SERIES SAGITTAL BLADE: Brand: STRYKER PERFORMANCE SERIES

## (undated) DEVICE — YANKAUER,OPEN TIP,W/O VENT,STERILE: Brand: MEDLINE INDUSTRIES, INC.

## (undated) DEVICE — Device

## (undated) DEVICE — T4 HOOD

## (undated) DEVICE — STERILE POLYISOPRENE POWDER-FREE SURGICAL GLOVES WITH EMOLLIENT COATING: Brand: PROTEXIS

## (undated) DEVICE — SUTURE VCRL SZ 2-0 L27IN ABSRB UD L36MM CP-1 1/2 CIR REV J266H

## (undated) DEVICE — SUTURE MCRYL SZ 3-0 L27IN ABSRB UD L19MM PS-2 3/8 CIR PRIM Y427H

## (undated) DEVICE — BOOT ORTH XL KNEE GRN TYVEK HI CVR SKID RESIST HEAT SEAL E

## (undated) DEVICE — TUBING SUCT 10FR MAL ALUM SHFT FN CAP VENT UNIV CONN W/ OBT

## (undated) DEVICE — 4-PORT MANIFOLD: Brand: NEPTUNE 2

## (undated) DEVICE — DERMABOND SKIN ADH 0.7ML -- DERMABOND ADVANCED 12/BX

## (undated) DEVICE — SUTURE FIBERWIRE SZ 2 W/ TAPERED NEEDLE BLUE L38IN NONABSORB BLU L26.5MM 1/2 CIRCLE AR7200

## (undated) DEVICE — STRAP,POSITIONING,KNEE/BODY,FOAM,4X60": Brand: MEDLINE

## (undated) DEVICE — PLASMABLADE PS210-030S 3.0S LOCK: Brand: PLASMABLADE™

## (undated) DEVICE — HANDPIECE SET WITH COAXIAL HIGH FLOW TIP AND SUCTION TUBE: Brand: INTERPULSE

## (undated) DEVICE — PAD,NON-ADHERENT,3X8,STERILE,LF,1/PK: Brand: MEDLINE

## (undated) DEVICE — COVER,MAYO STAND,STERILE: Brand: MEDLINE

## (undated) DEVICE — T-MAX DISPOSABLE FACE MASK 8 PER BOX

## (undated) DEVICE — PAD,ABDOMINAL,5"X9",ST,LF,25/BX: Brand: MEDLINE INDUSTRIES, INC.

## (undated) DEVICE — COVER LT HNDL PLAS RIG 1 PER PK

## (undated) DEVICE — 3M™ TEGADERM™ TRANSPARENT FILM DRESSING FRAME STYLE, 1628, 6 IN X 8 IN (15 CM X 20 CM), 10/CT 8CT/CASE: Brand: 3M™ TEGADERM™

## (undated) DEVICE — SUTURE ETHBND EXCEL SZ 5 L30IN NONABSORBABLE GRN L40MM V-37 MB66G

## (undated) DEVICE — SHEET, DRAPE, SPLIT, STERILE: Brand: MEDLINE

## (undated) DEVICE — STERILE POLYISOPRENE POWDER-FREE SURGICAL GLOVES: Brand: PROTEXIS

## (undated) DEVICE — PREP SKN CHLRAPRP APL 26ML STR --

## (undated) DEVICE — SOLUTION IRRIG 3000ML 0.9% SOD CHL FLX CONT 0797208] ICU MEDICAL INC]

## (undated) DEVICE — DRAPE,U/ SHT,SPLIT,PLAS,STERIL: Brand: MEDLINE

## (undated) DEVICE — 3M™ IOBAN™ 2 ANTIMICROBIAL INCISE DRAPE 6651EZ: Brand: IOBAN™ 2

## (undated) DEVICE — REM POLYHESIVE ADULT PATIENT RETURN ELECTRODE: Brand: VALLEYLAB

## (undated) DEVICE — 3M™ IOBAN™ 2 ANTIMICROBIAL INCISE DRAPE 6650EZ: Brand: IOBAN™ 2

## (undated) DEVICE — DRAPE,REIN 53X77,STERILE: Brand: MEDLINE

## (undated) DEVICE — INFECTION CONTROL KIT SYS

## (undated) DEVICE — GOWN,BREATHABLE,IMP SLV 3XL AURORA: Brand: MEDLINE

## (undated) DEVICE — BIT DRL SCR PERIPH 2.7MM DISP --

## (undated) DEVICE — SUTURE FIBERWIRE SZ 5 L38IN BLU L48MM 1/2 CIR CONVENTIONAL AR7213

## (undated) DEVICE — SLING ARM M 28-33CM BLK MESH FAB EZ OPN FR PNL W/

## (undated) DEVICE — SUTURE ABSRB BRAID COAT UD CT NO 1 36IN VCRL J959H